# Patient Record
Sex: MALE | Race: WHITE | NOT HISPANIC OR LATINO | Employment: FULL TIME | URBAN - METROPOLITAN AREA
[De-identification: names, ages, dates, MRNs, and addresses within clinical notes are randomized per-mention and may not be internally consistent; named-entity substitution may affect disease eponyms.]

---

## 2023-03-07 LAB
ANION GAP IN SER/PLAS: 12 MMOL/L (ref 10–20)
CALCIUM (MG/DL) IN SER/PLAS: 8.8 MG/DL (ref 8.6–10.3)
CARBON DIOXIDE, TOTAL (MMOL/L) IN SER/PLAS: 25 MMOL/L (ref 21–32)
CHLORIDE (MMOL/L) IN SER/PLAS: 101 MMOL/L (ref 98–107)
CREATININE (MG/DL) IN SER/PLAS: 1.42 MG/DL (ref 0.5–1.3)
ERYTHROCYTE DISTRIBUTION WIDTH (RATIO) BY AUTOMATED COUNT: 12.8 % (ref 11.5–14.5)
ERYTHROCYTE MEAN CORPUSCULAR HEMOGLOBIN CONCENTRATION (G/DL) BY AUTOMATED: 31.6 G/DL (ref 32–36)
ERYTHROCYTE MEAN CORPUSCULAR VOLUME (FL) BY AUTOMATED COUNT: 97 FL (ref 80–100)
ERYTHROCYTES (10*6/UL) IN BLOOD BY AUTOMATED COUNT: 4.71 X10E12/L (ref 4.5–5.9)
GFR MALE: 54 ML/MIN/1.73M2
GLUCOSE (MG/DL) IN SER/PLAS: 106 MG/DL (ref 74–99)
HEMATOCRIT (%) IN BLOOD BY AUTOMATED COUNT: 45.5 % (ref 41–52)
HEMOGLOBIN (G/DL) IN BLOOD: 14.4 G/DL (ref 13.5–17.5)
INR IN PPP BY COAGULATION ASSAY: 3.6 (ref 0.9–1.1)
LEUKOCYTES (10*3/UL) IN BLOOD BY AUTOMATED COUNT: 10.9 X10E9/L (ref 4.4–11.3)
NRBC (PER 100 WBCS) BY AUTOMATED COUNT: 0 /100 WBC (ref 0–0)
PLATELETS (10*3/UL) IN BLOOD AUTOMATED COUNT: 160 X10E9/L (ref 150–450)
POTASSIUM (MMOL/L) IN SER/PLAS: 4.2 MMOL/L (ref 3.5–5.3)
PROTHROMBIN TIME (PT) IN PPP BY COAGULATION ASSAY: 42.5 SEC (ref 9.8–13.4)
SODIUM (MMOL/L) IN SER/PLAS: 134 MMOL/L (ref 136–145)
UREA NITROGEN (MG/DL) IN SER/PLAS: 26 MG/DL (ref 6–23)

## 2023-10-16 ENCOUNTER — HOSPITAL ENCOUNTER (OUTPATIENT)
Dept: CARDIOLOGY | Facility: CLINIC | Age: 68
Discharge: HOME | End: 2023-10-16
Payer: COMMERCIAL

## 2023-10-16 DIAGNOSIS — I44.30 UNSPECIFIED ATRIOVENTRICULAR BLOCK: ICD-10-CM

## 2023-10-16 DIAGNOSIS — I48.0 PAROXYSMAL ATRIAL FIBRILLATION (MULTI): Primary | ICD-10-CM

## 2023-10-16 DIAGNOSIS — Z95.0 PRESENCE OF CARDIAC PACEMAKER: ICD-10-CM

## 2023-10-16 PROBLEM — M48.07 LUMBOSACRAL SPINAL STENOSIS: Status: ACTIVE | Noted: 2023-10-16

## 2023-10-16 PROBLEM — E66.9 OBESITY: Status: ACTIVE | Noted: 2023-10-16

## 2023-10-16 PROBLEM — Q77.4 ACHONDROPLASIA (HHS-HCC): Status: ACTIVE | Noted: 2023-10-16

## 2023-10-16 PROBLEM — I10 HYPERTENSION: Status: ACTIVE | Noted: 2023-10-16

## 2023-10-16 PROBLEM — Z98.890 S/P LEFT ATRIAL APPENDAGE LIGATION: Status: ACTIVE | Noted: 2023-10-16

## 2023-10-16 PROBLEM — I34.2 NONRHEUMATIC MITRAL VALVE STENOSIS: Status: ACTIVE | Noted: 2023-10-16

## 2023-10-16 PROBLEM — Z95.2 S/P MVR (MITRAL VALVE REPLACEMENT): Status: ACTIVE | Noted: 2023-10-16

## 2023-10-16 PROBLEM — I34.0 MITRAL REGURGITATION: Status: ACTIVE | Noted: 2023-10-16

## 2023-10-16 PROBLEM — M79.10 MYALGIA: Status: ACTIVE | Noted: 2023-10-16

## 2023-10-16 PROBLEM — G47.33 OSA ON CPAP: Status: ACTIVE | Noted: 2023-10-16

## 2023-10-16 PROBLEM — E78.5 HYPERLIPIDEMIA: Status: ACTIVE | Noted: 2023-10-16

## 2023-10-16 PROBLEM — I50.30 HEART FAILURE WITH PRESERVED LEFT VENTRICULAR FUNCTION (HFPEF) (MULTI): Status: ACTIVE | Noted: 2023-10-16

## 2023-10-16 PROBLEM — M54.12 CERVICAL RADICULOPATHY: Status: ACTIVE | Noted: 2023-10-16

## 2023-10-16 PROBLEM — N52.9 ED (ERECTILE DYSFUNCTION): Status: ACTIVE | Noted: 2023-10-16

## 2023-10-16 PROBLEM — I25.10 MILD CAD: Status: ACTIVE | Noted: 2023-10-16

## 2023-10-16 PROCEDURE — 93296 REM INTERROG EVL PM/IDS: CPT

## 2023-10-16 PROCEDURE — 93294 REM INTERROG EVL PM/LDLS PM: CPT | Performed by: INTERNAL MEDICINE

## 2023-10-16 RX ORDER — FLUTICASONE PROPIONATE 50 MCG
2 SPRAY, SUSPENSION (ML) NASAL DAILY
COMMUNITY
Start: 2023-09-28 | End: 2024-01-25 | Stop reason: ALTCHOICE

## 2023-10-16 RX ORDER — POTASSIUM CHLORIDE 1500 MG/1
20 TABLET, EXTENDED RELEASE ORAL NIGHTLY
COMMUNITY
Start: 2023-07-17

## 2023-10-16 RX ORDER — METOPROLOL TARTRATE 25 MG/1
25 TABLET, FILM COATED ORAL 2 TIMES DAILY
COMMUNITY
Start: 2023-09-22 | End: 2024-05-25 | Stop reason: HOSPADM

## 2023-10-16 RX ORDER — LOSARTAN POTASSIUM 50 MG/1
50 TABLET ORAL DAILY
COMMUNITY
Start: 2023-09-22 | End: 2024-05-06

## 2023-10-16 RX ORDER — RIVAROXABAN 20 MG/1
20 TABLET, FILM COATED ORAL DAILY
Qty: 90 TABLET | Refills: 3 | Status: SHIPPED | OUTPATIENT
Start: 2023-10-16 | End: 2024-01-08 | Stop reason: SDUPTHER

## 2023-10-16 RX ORDER — AMIODARONE HYDROCHLORIDE 200 MG/1
200 TABLET ORAL DAILY
COMMUNITY
Start: 2023-09-15 | End: 2024-01-25 | Stop reason: ALTCHOICE

## 2023-10-16 RX ORDER — PANTOPRAZOLE SODIUM 40 MG/1
40 TABLET, DELAYED RELEASE ORAL
COMMUNITY
Start: 2023-03-09 | End: 2024-01-25 | Stop reason: ALTCHOICE

## 2023-10-16 RX ORDER — MULTIVIT-MIN/IRON FUM/FOLIC AC 7.5 MG-4
1 TABLET ORAL EVERY OTHER DAY
COMMUNITY

## 2023-10-16 RX ORDER — PRAVASTATIN SODIUM 10 MG/1
10 TABLET ORAL NIGHTLY
COMMUNITY
Start: 2023-10-15 | End: 2024-05-06

## 2023-10-16 RX ORDER — RIVAROXABAN 20 MG/1
20 TABLET, FILM COATED ORAL DAILY
COMMUNITY
Start: 2023-08-26 | End: 2023-10-16 | Stop reason: SDUPTHER

## 2023-10-16 RX ORDER — SILDENAFIL CITRATE 20 MG/1
20 TABLET ORAL AS NEEDED
COMMUNITY
Start: 2023-05-24

## 2023-10-16 RX ORDER — FUROSEMIDE 40 MG/1
40 TABLET ORAL DAILY
COMMUNITY
Start: 2023-10-07 | End: 2024-05-29 | Stop reason: ALTCHOICE

## 2023-11-28 ENCOUNTER — HOSPITAL ENCOUNTER (OUTPATIENT)
Dept: CARDIOLOGY | Facility: CLINIC | Age: 68
Discharge: HOME | End: 2023-11-28
Payer: COMMERCIAL

## 2023-11-28 DIAGNOSIS — I44.30 INCOMPLETE HEART BLOCK: ICD-10-CM

## 2023-11-28 DIAGNOSIS — Z95.0 PACEMAKER: ICD-10-CM

## 2023-12-28 ENCOUNTER — TELEPHONE (OUTPATIENT)
Dept: CARDIOLOGY | Facility: CLINIC | Age: 68
End: 2023-12-28
Payer: MEDICARE

## 2023-12-29 DIAGNOSIS — I48.0 PAROXYSMAL ATRIAL FIBRILLATION (MULTI): Primary | ICD-10-CM

## 2023-12-29 RX ORDER — DABIGATRAN ETEXILATE 75 MG/1
75 CAPSULE ORAL 2 TIMES DAILY
Qty: 180 CAPSULE | Refills: 3 | Status: SHIPPED | OUTPATIENT
Start: 2023-12-29 | End: 2024-05-25 | Stop reason: HOSPADM

## 2023-12-29 NOTE — TELEPHONE ENCOUNTER
Called and left detailed VM to pt, notified Dr. Mann ordered Pradaxa to [facility] pharmacy as possible cheaper alternative to Xarelto, and sent referral to  pharmacy for possible cost assistance if needed. Requested for pt to call back if he has any additional questions or concerns.

## 2024-01-02 NOTE — TELEPHONE ENCOUNTER
LM for pt that RX for generic pradaxa was sent to [facility] per Dr. AMINTA Mann. Also instructed pt to speak with our pharmacy staff when they called him to see about pt assistance for him.

## 2024-01-04 DIAGNOSIS — I48.0 PAROXYSMAL ATRIAL FIBRILLATION (MULTI): ICD-10-CM

## 2024-01-04 RX ORDER — RIVAROXABAN 20 MG/1
20 TABLET, FILM COATED ORAL DAILY
Qty: 90 TABLET | Refills: 3 | Status: CANCELLED | OUTPATIENT
Start: 2024-01-04

## 2024-01-04 NOTE — TELEPHONE ENCOUNTER
Called [facility]. They need actual script with signature. Please refill and print. We will fax to [facility] pharmacy at [phone] with customer ID of [id number]

## 2024-01-05 NOTE — TELEPHONE ENCOUNTER
Pts son called in and stated that # [phone] is not the appropriate contact number for Ananth, son provided  #[phone] to contact patient regarding this. I called and left VM to [phone] and requested for Ananth to call us back to discuss further.

## 2024-01-08 ENCOUNTER — TELEPHONE (OUTPATIENT)
Dept: CARDIOLOGY | Facility: CLINIC | Age: 69
End: 2024-01-08
Payer: MEDICARE

## 2024-01-08 DIAGNOSIS — I48.0 PAROXYSMAL ATRIAL FIBRILLATION (MULTI): ICD-10-CM

## 2024-01-08 RX ORDER — RIVAROXABAN 20 MG/1
20 TABLET, FILM COATED ORAL DAILY
Qty: 90 TABLET | Refills: 3 | Status: SHIPPED | OUTPATIENT
Start: 2024-01-08 | End: 2024-01-08

## 2024-01-08 NOTE — TELEPHONE ENCOUNTER
Pt called to tell us that pradaxa will be 1/3 cheaper the price of xarelto. He will have initiate pradaxa after his current prescription is complete of xarelto. I updated med list.

## 2024-01-08 NOTE — TELEPHONE ENCOUNTER
Pt still unaware of whether pradaxa would be cheaper. He is calling [facility] to compare prices. He will call us back when he decides. When he does do so please update medication list

## 2024-01-08 NOTE — TELEPHONE ENCOUNTER
Faxed prescription to [phone] ( [facility]). Called and left a message on VM letting patient know. Left return phone number in case he had questions.

## 2024-01-18 ENCOUNTER — TELEPHONE (OUTPATIENT)
Dept: CARDIOLOGY | Facility: CLINIC | Age: 69
End: 2024-01-18
Payer: MEDICARE

## 2024-01-18 DIAGNOSIS — I48.0 PAROXYSMAL ATRIAL FIBRILLATION (MULTI): Primary | ICD-10-CM

## 2024-01-18 NOTE — PROGRESS NOTES
"Pharmacist Clinic: Anticoagulation Management  Ananth Gimenez was referred to the Clinical Pharmacy Team for his anticoagulation management.    Referring Provider:  Da Mann  _______________________________________________________________________  PHARMACY ASSESSMENT    Allergies Reviewed? Yes, intolerant to atorvastatin (myalgia)  Home Pharmacy Reviewed? Yes, [facility] [address]    Affordability/Accessibility: ran out of Xarelto 20 mg the past month, out of pocket cost at local pharmacy is high. Patient reported ordering xarelto through [place] [facility] per cardiology office advice  Adherence/Organization: Patient reported adherence to all medications  Adverse Effects: patient noticed that he bruised easily when on Xarelto, manageable. Will continue to monitor      MEDICATION RECONCILIATION  Added:  - aspirin 81 mg - 2 tablets (162 mg) in the AM per Dr. Mann  - Xarelto 20 mg AM, patient ran out of medication, pending VAF  Changed:  - multivitamin every other day. Patient reported taking occasionally, would like to resume taking daily   Removed:  - amiodarone 200 mg. Stopped by Dr. Burks   - pantoprazole 40 mg, finished after surgery  - fluticasone    RELEVANT LAB RESULTS  Lab Results   Component Value Date    BILITOT 1.0 07/05/2022    CALCIUM 8.8 03/07/2023    CO2 25 03/07/2023     03/07/2023    CREATININE 1.42 (H) 03/07/2023    GLUCOSE 106 (H) 03/07/2023    ALKPHOS 103 07/05/2022    K 4.2 03/07/2023    PROT 6.8 07/05/2022     (L) 03/07/2023    AST 27 07/05/2022    ALT 34 07/05/2022    BUN 26 (H) 03/07/2023    ANIONGAP 12 03/07/2023    MG 2.05 07/05/2022    PHOS 3.7 12/09/2021    ALBUMIN 4.1 07/05/2022    GFRMALE 54 (A) 03/07/2023     Lab Results   Component Value Date    TRIG 62 08/17/2019    CHOL 129 08/17/2019    HDL 45.5 08/17/2019     No results found for: \"BMCBC\", \"CBCDIF\"       DRUG INTERACTIONS  - " No  _______________________________________________________________________  ANTICOAGULATION ASSESSMENT    The ASCVD Risk score (Hema JOSE, et al., 2019) failed to calculate for the following reasons:    Cannot find a previous HDL lab    Cannot find a previous total cholesterol lab    The smoking status is invalid    DIAGNOSIS: treatment of nonvalvular atrial fibrilliation stroke and systemic embolism  - Patient is projected to be on anticoagulation indefinitely  - [id number] Score: [3] (only included if diagnosis is atrial fibrillation)   Age: [<65 (0)] [65-74 (+1)] [> 75 (+2)]: 1  Sex: [Male/Female (+1)]: 0  CHF history: [No/Yes(+1)]: 1  Hypertension history: [No/Yes(+1)]: 1  Stroke/TIA/thromboembolism history: [No/Yes(+2)]: 0  Vascular disease history (prior MI, peripheral artery disease, aortic plaque): [No/Yes(+1)]: 0  Diabetes history: [No/Yes(+1)]: 0    CURRENT PHARMACOTHERAPY:   - Aspirin 81 mg - 2 tablets (162 mg) PO daily    PERTINANT MEDICAL HISTORY:  - Medical history: Mitral valve replacement, Afib, HFpEF, HTN  - Medication history: Xarelto 20 mg daily  _______________________________________________________________________  PATIENT EDUCATION/GOALS  - Counseled patient on MOA, expectations, duration of therapy, contraindications, administration, and monitoring parameters  - Counseled patient of side effects that are indicative of bleeding such as dark tarry stool, unexplainable bruising, or vomiting up a coffee ground like substance  - Answered all patient questions and concerns  _______________________________________________________________________  RECOMMENDATIONS/PLAN  1. Patient was hesitant to start Pradaxa given high bleeding risk, currently taking aspirin 162 mg PO daily. He has ordered Xarelto from [place] and is waiting for his shipment to arrive.  Continue aspirin 81 mg - 2 tablets PO daily     Patient Assistance Program (PAP)    Patient verbally reports monthly or yearly income  which is more than 400% federal poverty level. Patient reported recently retired in November 2023.    Application for program to be submitted for the following medications: Xarelto    Prescription Insurance: Yes  Members of Household: 2  Files Taxes: Yes  Life attestation event form was emailed to patient    Patient will be faxing financial information to pharmacist directly at [phone].      If approved medication must be filled through [facility] pharmacy and mailed to patient.       Next Cardiology Appointment: 3/5/2024  Clinical Pharmacist follow up: TBD Xarelto approval  Type of Encounter: Renetta Chiu PharmD    Verbal consent to manage patient's drug therapy was obtained from the patient . They were informed they may decline to participate or withdraw from participation in pharmacy services at any time.    Continue all meds under the continuation of care with the referring provider and clinical pharmacy team.

## 2024-01-18 NOTE — TELEPHONE ENCOUNTER
Patient called to ask what he should do about his Xarelto. Patient ordered Xarelto from [facility] pharmacy and it is not expected to arrive for a few weeks. Patient would like to know if there is anything he can take until his medication arrives.

## 2024-01-25 ENCOUNTER — TELEMEDICINE (OUTPATIENT)
Dept: PHARMACY | Facility: HOSPITAL | Age: 69
End: 2024-01-25
Payer: MEDICARE

## 2024-01-25 DIAGNOSIS — I48.0 PAROXYSMAL ATRIAL FIBRILLATION (MULTI): ICD-10-CM

## 2024-01-25 RX ORDER — ASPIRIN 81 MG/1
162 TABLET ORAL DAILY
COMMUNITY
End: 2024-03-07 | Stop reason: ALTCHOICE

## 2024-01-25 NOTE — Clinical Note
Flaco Mann! My resident spoke with Ananth regarding his anticoagulation therapy. He reported he has not started Pradaxa, only taking aspirin. He ordered Xarelto from a [place] source? We do not recommend patients going down this route. We are going to try to get him set up with [facility] P[facility] to help get Xarelto for free. I am not sure if your office has samples in the meantime? Thank you.

## 2024-01-25 NOTE — Clinical Note
Patient was hesitant to start Pradaxa given high bleeding risk, currently taking aspirin 162 mg PO daily. He has ordered Xarelto from [facility] and is waiting for his shipment to arrive. Could cardiologist reach out and provide patient with samples?

## 2024-02-19 ENCOUNTER — HOSPITAL ENCOUNTER (OUTPATIENT)
Dept: CARDIOLOGY | Facility: CLINIC | Age: 69
Discharge: HOME | End: 2024-02-19
Payer: MEDICARE

## 2024-02-19 DIAGNOSIS — I44.30 UNSPECIFIED ATRIOVENTRICULAR BLOCK: ICD-10-CM

## 2024-02-19 DIAGNOSIS — Z95.0 PRESENCE OF CARDIAC PACEMAKER: ICD-10-CM

## 2024-02-19 PROCEDURE — 93294 REM INTERROG EVL PM/LDLS PM: CPT | Performed by: INTERNAL MEDICINE

## 2024-02-19 PROCEDURE — 93296 REM INTERROG EVL PM/IDS: CPT

## 2024-03-05 ENCOUNTER — OFFICE VISIT (OUTPATIENT)
Dept: CARDIOLOGY | Facility: HOSPITAL | Age: 69
End: 2024-03-05
Payer: MEDICARE

## 2024-03-05 VITALS
BODY MASS INDEX: 28.66 KG/M2 | OXYGEN SATURATION: 96 % | HEART RATE: 86 BPM | SYSTOLIC BLOOD PRESSURE: 132 MMHG | WEIGHT: 146 LBS | HEIGHT: 60 IN | DIASTOLIC BLOOD PRESSURE: 57 MMHG

## 2024-03-05 DIAGNOSIS — I48.0 PAROXYSMAL ATRIAL FIBRILLATION (MULTI): Primary | ICD-10-CM

## 2024-03-05 DIAGNOSIS — R06.02 SHORTNESS OF BREATH: ICD-10-CM

## 2024-03-05 LAB
ATRIAL RATE: 86 BPM
P AXIS: 73 DEGREES
P OFFSET: 165 MS
P ONSET: 120 MS
PR INTERVAL: 136 MS
Q ONSET: 188 MS
QRS COUNT: 14 BEATS
QRS DURATION: 160 MS
QT INTERVAL: 470 MS
QTC CALCULATION(BAZETT): 562 MS
QTC FREDERICIA: 530 MS
R AXIS: 246 DEGREES
T AXIS: 62 DEGREES
T OFFSET: 423 MS
VENTRICULAR RATE: 86 BPM

## 2024-03-05 PROCEDURE — 99214 OFFICE O/P EST MOD 30 MIN: CPT | Performed by: INTERNAL MEDICINE

## 2024-03-05 PROCEDURE — 3078F DIAST BP <80 MM HG: CPT | Performed by: INTERNAL MEDICINE

## 2024-03-05 PROCEDURE — 1036F TOBACCO NON-USER: CPT | Performed by: INTERNAL MEDICINE

## 2024-03-05 PROCEDURE — 99214 OFFICE O/P EST MOD 30 MIN: CPT | Mod: 25 | Performed by: INTERNAL MEDICINE

## 2024-03-05 PROCEDURE — 1159F MED LIST DOCD IN RCRD: CPT | Performed by: INTERNAL MEDICINE

## 2024-03-05 PROCEDURE — 93005 ELECTROCARDIOGRAM TRACING: CPT | Performed by: INTERNAL MEDICINE

## 2024-03-05 PROCEDURE — 3075F SYST BP GE 130 - 139MM HG: CPT | Performed by: INTERNAL MEDICINE

## 2024-03-05 PROCEDURE — 1126F AMNT PAIN NOTED NONE PRSNT: CPT | Performed by: INTERNAL MEDICINE

## 2024-03-05 ASSESSMENT — ENCOUNTER SYMPTOMS
DEPRESSION: 0
LOSS OF SENSATION IN FEET: 0
OCCASIONAL FEELINGS OF UNSTEADINESS: 0

## 2024-03-05 ASSESSMENT — PAIN SCALES - GENERAL: PAINLEVEL: 0-NO PAIN

## 2024-03-05 NOTE — PROGRESS NOTES
Referred by Fredis Loredo MD provider found for   Chief Complaint   Patient presents with    Atrial Fibrillation        Ananth Gimenez is a 68 y.o. year old male patient with h/o PAF s/p A fib RFA a year ago. Presents for follow up.      PMHx/PSHx: As above    FamHx: unremarkable     Allergies:  Allergies   Allergen Reactions    Atorvastatin Myalgia     Pain in extremities. Tolerate low dose pravastatin        Review of Systems    Constitutional: not feeling tired.   Eyes: no eyesight problems.   ENT: no hearing loss and no nosebleeds.   Cardiovascular: no intermittent leg claudication and as noted in HPI.   Respiratory: no chronic cough and no shortness of breath.   Gastrointestinal: no change in bowel habits and no blood in stools.   Genitourinary: no urinary frequency and no hematuria.   Skin: no skin rashes.   Neurological: no seizures and no frequent falls.   Psychiatric: no depression and not suicidal.   All other systems have been reviewed and are negative for complaint.     Outpatient Medications:  Current Outpatient Medications   Medication Instructions    aspirin 162 mg, oral, Daily    dabigatran etexilate (PRADAXA) 75 mg, oral, 2 times daily, Do not crush or chew.    furosemide (LASIX) 40 mg, oral, Daily, 40 mg in the AM,     Klor-Con M20 20 mEq ER tablet 20 mEq, oral, Nightly    losartan (COZAAR) 50 mg, oral, Daily    metoprolol tartrate (LOPRESSOR) 25 mg, oral, 2 times daily    multivitamin with minerals (multivit-min-iron fum-folic ac) tablet 1 tablet, oral, Every other day    pravastatin (PRAVACHOL) 10 mg, oral, Nightly    rivaroxaban (XARELTO) 20 mg, oral, Every morning, Take with food. Pending PAP/VAF approval    sildenafil (REVATIO) 20 mg, oral, As needed         Last Recorded Vitals:      2/21/2023     9:24 AM 3/9/2023     8:43 AM 4/24/2023    11:17 AM 4/24/2023    12:06 PM 6/6/2023    11:57 AM 9/6/2023     1:46 PM 3/5/2024     2:09 PM   Vitals   Systolic 125  138 138 115 139 132  "  Diastolic 62  79 78 59 71 57   Heart Rate 60  74 77 62 65 86   Temp    36.5 °C (97.7 °F)      Resp    16  16    Height (in) 1.27 m (4' 2\") 1.27 m (4' 2\") 1.27 m (4' 2\") 1.27 m (4' 2\") 1.27 m (4' 2\") 1.27 m (4' 2\") 1.27 m (4' 2\")   Weight (lb) 156.25 156.09 155.38 155 152 146 146   BMI 43.94 kg/m2 43.9 kg/m2 43.7 kg/m2 43.59 kg/m2 42.75 kg/m2 41.06 kg/m2 41.06 kg/m2   BSA (m2) 1.58 m2 1.58 m2 1.58 m2 1.57 m2 1.56 m2 1.53 m2 1.53 m2   Visit Report       Report    Visit Vitals  /57 (BP Location: Left arm, Patient Position: Sitting, BP Cuff Size: Small adult)   Pulse 86   Ht 1.27 m (4' 2\")   Wt 66.2 kg (146 lb)   SpO2 96%   BMI 41.06 kg/m²   Smoking Status Never   BSA 1.53 m²        Physical Exam:  Constitutional: alert and in no acute distress.   Eyes: no erythema, swelling or discharge from the eye .   Neck: neck is supple, symmetric, trachea midline, no masses  and no thyromegaly .   Pulmonary: no increased work of breathing or signs of respiratory distress  and lungs clear to auscultation.    Cardiovascular: carotid pulses 2+ bilaterally with no bruit , JVP was normal, no thrills , regular rhythm, normal S1 and S2, no murmurs , pedal pulses 2+ bilaterally  and no edema .   Abdomen: abdomen non-tender, no masses  and no hepatomegaly .   Skin: skin warm and dry, normal skin turgor .   Psychiatric judgment and insight is normal  and oriented to person, place and time .        Assessment/Plan   Problem List Items Addressed This Visit             ICD-10-CM    Paroxysmal atrial fibrillation (CMS/HCC) - Primary I48.0    Relevant Orders    ECG 12 lead (Clinic Performed)     Other Visit Diagnoses         Codes    Shortness of breath     R06.02    Relevant Orders    Transthoracic Echo (TTE) Complete    Stress Test            Ananth Gimenez is a 68 y.o. year old male patient with h/o PAF s/p A fib RFA a year ago. Presents for follow up.    The patient reports doing well and denies palpitations or chest pain. His " only complain is getting SOB while walking a block, which recovers after resting. His current ECG shows A sense V paced rhythm, HR 86 bpm. Device interrogations showed normal device function, with episodes of A Tach reported by the device as A fib. Will continue his current medications. Will request a new echocardiogram and a treadmill stress test and will follow up with the results.         Fredis Burks MD  Cardiac Electrophysiology      Thank you very much for allowing me to participate in the care of this pleasant patient. Please do not hesitate to contact me with any further questions or concerns regarding his care.    **Disclaimer: This note was dictated by speech recognition, and every effort has been made to prevent any error in transcription, however minor errors may be present**

## 2024-03-05 NOTE — PROGRESS NOTES
"Pharmacist Clinic: Anticoagulation Management  Ananth Gimenez was referred to the Clinical Pharmacy Team for his anticoagulation management.    Referring Provider:  Da Mann     Last Appointment w/ Pharmacist: 1/5/2024  Pharmacist Name: Yamila Chiu  _______________________________________________________________________  PHARMACY ASSESSMENT    Review of Past Appointment:   - Patient was hesitant to start Pradaxa due to bleeding risk, was taking aspirin 162 mg daily, while waiting for Xarelto shipment from [facility]. He got a few month supply and is taking Xarelto as prescribed. Patient is interested in [facility] going forward for his Xarelto prescription.    RELEVANT LAB RESULTS  Lab Results   Component Value Date    BILITOT 1.0 07/05/2022    CALCIUM 8.8 03/07/2023    CO2 25 03/07/2023     03/07/2023    CREATININE 1.42 (H) 03/07/2023    GLUCOSE 106 (H) 03/07/2023    ALKPHOS 103 07/05/2022    K 4.2 03/07/2023    PROT 6.8 07/05/2022     (L) 03/07/2023    AST 27 07/05/2022    ALT 34 07/05/2022    BUN 26 (H) 03/07/2023    ANIONGAP 12 03/07/2023    MG 2.05 07/05/2022    PHOS 3.7 12/09/2021    ALBUMIN 4.1 07/05/2022    GFRMALE 54 (A) 03/07/2023     Lab Results   Component Value Date    TRIG 62 08/17/2019    CHOL 129 08/17/2019    HDL 45.5 08/17/2019     No results found for: \"BMCBC\", \"CBCDIF\"   _______________________________________________________________________  ANTICOAGULATION ASSESSMENT    The ASCVD Risk score (Hema JOSE, et al., 2019) failed to calculate for the following reasons:    Cannot find a previous HDL lab    Cannot find a previous total cholesterol lab    DIAGNOSIS: treatment of nonvalvular atrial fibrilliation stroke and systemic embolism  - Patient is projected to be on anticoagulation indefinitely  - [id number] Score: [3] (only included if diagnosis is atrial fibrillation)   Age: [<65 (0)] [65-74 (+1)] [> 75 (+2)]: 1  Sex: [Male/Female (+1)]: 0  CHF history: [No/Yes(+1)]: 1  Hypertension " history: [No/Yes(+1)]: 1  Stroke/TIA/thromboembolism history: [No/Yes(+2)]: 0  Vascular disease history (prior MI, peripheral artery disease, aortic plaque): [No/Yes(+1)]: 0  Diabetes history: [No/Yes(+1)]: 0    CURRENT PHARMACOTHERAPY:   - Xarelto 20 mg daily   - CrCl 30 ml/min (based on Scr 1.42 mg/dl 3/7/23, adjusted body weight), candidate for dose reduction pending updated lab    UPDATE ON PHARMACOTHERAPY:   Affordability/Accessibility: patient is unable to afford out of pocket cost for Xarelto, received a few month supply of Xarelto from [facility]  Adherence/Organization: taking Xarelto as prescribed  Adverse Effects: none reported  Recent Hospitalizations: none  Recent Falls/Trauma: none  Changes in Tobacco or Alcohol Intake: was not discussed    DISCUSSION/NOTES:  - Patient reported doing okay on Xarelto, no side effect reported at this visit. Patient was unaware of change in pharmacy insurance benefit; provided patient updated information and insurance phone number.     _______________________________________________________________________  PATIENT EDUCATION/GOALS  - Counseled patient on MOA, expectations, duration of therapy, contraindications, administration, and monitoring parameters  - Counseled patient of side effects that are indicative of bleeding such as dark tarry stool, unexplainable bruising, or vomiting up a coffee ground like substance  - Answered all patient questions and concerns  _______________________________________________________________________  RECOMMENDATIONS/PLAN  1. Continue Xarelto 20 mg daily  2. Needs update in labs, previous lab was 3/2023     Patient Assistance Program (PAP)    Patient verbally reports monthly or yearly income which is more than 400% federal poverty level in 2022, recently retired in November 2023. Patient reported currently receiving pension as income, will be filing taxes for 2023, his income post MCC is less than 400% federal poverty  level    Application for program to be submitted for the following medications: Xarelto    Prescription Insurance: Yes  Members of Household: 2  Files Taxes: Yes    Patient will be email financial information to pharmacist directly at [email or web address].    Patient aware this process may take up to 6 weeks.     If approved medication must be filled through [facility] pharmacy and mailed to patient.         Next Cardiology Appointment: 4/3/2024  Clinical Pharmacist follow up: 6/6/2024  VAF/Application Expiration: No  Type of Encounter: Virtual    Joshua Chiu    Verbal consent to manage patient's drug therapy was obtained from the patient . They were informed they may decline to participate or withdraw from participation in pharmacy services at any time.    Continue all meds under the continuation of care with the referring provider and clinical pharmacy team.

## 2024-03-07 ENCOUNTER — TELEMEDICINE (OUTPATIENT)
Dept: PHARMACY | Facility: HOSPITAL | Age: 69
End: 2024-03-07
Payer: MEDICARE

## 2024-03-07 DIAGNOSIS — I48.0 PAROXYSMAL ATRIAL FIBRILLATION (MULTI): Primary | ICD-10-CM

## 2024-03-07 NOTE — Clinical Note
Flaco Mann! Patient reported doing okay on Xarelto, no side effect reported at this visit. Patient was unaware of change in pharmacy insurance benefit; provided patient updated information and insurance phone number.  - Also, looks like he may need updated labs. Last lab panel was 3/2023. I see he sees you in April so wanted to give you a heads up.

## 2024-03-20 ENCOUNTER — HOSPITAL ENCOUNTER (OUTPATIENT)
Dept: CARDIOLOGY | Facility: HOSPITAL | Age: 69
Discharge: HOME | End: 2024-03-20
Payer: MEDICARE

## 2024-03-20 VITALS
HEIGHT: 60 IN | SYSTOLIC BLOOD PRESSURE: 132 MMHG | WEIGHT: 146 LBS | DIASTOLIC BLOOD PRESSURE: 57 MMHG | BODY MASS INDEX: 28.66 KG/M2

## 2024-03-20 DIAGNOSIS — R06.02 SHORTNESS OF BREATH: ICD-10-CM

## 2024-03-20 LAB
AORTIC VALVE MEAN GRADIENT: 6.1 MMHG
AORTIC VALVE PEAK VELOCITY: 1.69 M/S
AV PEAK GRADIENT: 11.4 MMHG
AVA (PEAK VEL): 1.8 CM2
AVA (VTI): 1.71 CM2
EJECTION FRACTION APICAL 4 CHAMBER: 33.4
EJECTION FRACTION: 36 %
LEFT ATRIUM VOLUME AREA LENGTH INDEX BSA: 45.5 ML/M2
LEFT VENTRICLE INTERNAL DIMENSION DIASTOLE: 4.96 CM (ref 3.5–6)
LEFT VENTRICULAR OUTFLOW TRACT DIAMETER: 1.99 CM
MITRAL VALVE E/E' RATIO: 36.65
RIGHT VENTRICLE FREE WALL PEAK S': 8 CM/S
RIGHT VENTRICLE PEAK SYSTOLIC PRESSURE: 33.5 MMHG
TRICUSPID ANNULAR PLANE SYSTOLIC EXCURSION: 1.5 CM

## 2024-03-20 PROCEDURE — 93017 CV STRESS TEST TRACING ONLY: CPT

## 2024-03-20 PROCEDURE — 2500000004 HC RX 250 GENERAL PHARMACY W/ HCPCS (ALT 636 FOR OP/ED): Performed by: INTERNAL MEDICINE

## 2024-03-20 PROCEDURE — 93016 CV STRESS TEST SUPVJ ONLY: CPT | Performed by: INTERNAL MEDICINE

## 2024-03-20 PROCEDURE — 93306 TTE W/DOPPLER COMPLETE: CPT | Performed by: INTERNAL MEDICINE

## 2024-03-20 PROCEDURE — 93018 CV STRESS TEST I&R ONLY: CPT | Performed by: INTERNAL MEDICINE

## 2024-03-20 PROCEDURE — 93306 TTE W/DOPPLER COMPLETE: CPT

## 2024-03-20 RX ADMIN — PERFLUTREN 2 ML OF DILUTION: 6.52 INJECTION, SUSPENSION INTRAVENOUS at 14:30

## 2024-04-03 ENCOUNTER — OFFICE VISIT (OUTPATIENT)
Dept: CARDIOLOGY | Facility: HOSPITAL | Age: 69
End: 2024-04-03
Payer: MEDICARE

## 2024-04-03 VITALS
HEIGHT: 60 IN | SYSTOLIC BLOOD PRESSURE: 121 MMHG | BODY MASS INDEX: 30.63 KG/M2 | WEIGHT: 156 LBS | DIASTOLIC BLOOD PRESSURE: 76 MMHG | HEART RATE: 76 BPM | OXYGEN SATURATION: 97 %

## 2024-04-03 DIAGNOSIS — R06.02 SHORTNESS OF BREATH: Primary | ICD-10-CM

## 2024-04-03 DIAGNOSIS — I48.0 PAROXYSMAL ATRIAL FIBRILLATION (MULTI): ICD-10-CM

## 2024-04-03 LAB
ATRIAL RATE: 77 BPM
Q ONSET: 213 MS
QRS COUNT: 12 BEATS
QRS DURATION: 134 MS
QT INTERVAL: 424 MS
QTC CALCULATION(BAZETT): 477 MS
QTC FREDERICIA: 458 MS
R AXIS: -62 DEGREES
T AXIS: 112 DEGREES
T OFFSET: 425 MS
VENTRICULAR RATE: 76 BPM

## 2024-04-03 PROCEDURE — 1036F TOBACCO NON-USER: CPT | Performed by: INTERNAL MEDICINE

## 2024-04-03 PROCEDURE — 99213 OFFICE O/P EST LOW 20 MIN: CPT | Performed by: INTERNAL MEDICINE

## 2024-04-03 PROCEDURE — 3074F SYST BP LT 130 MM HG: CPT | Performed by: INTERNAL MEDICINE

## 2024-04-03 PROCEDURE — 1159F MED LIST DOCD IN RCRD: CPT | Performed by: INTERNAL MEDICINE

## 2024-04-03 PROCEDURE — 93005 ELECTROCARDIOGRAM TRACING: CPT | Performed by: INTERNAL MEDICINE

## 2024-04-03 PROCEDURE — 3078F DIAST BP <80 MM HG: CPT | Performed by: INTERNAL MEDICINE

## 2024-04-03 RX ORDER — REGADENOSON 0.08 MG/ML
0.4 INJECTION, SOLUTION INTRAVENOUS
Status: CANCELLED | OUTPATIENT
Start: 2024-04-03

## 2024-04-03 ASSESSMENT — ENCOUNTER SYMPTOMS
LOSS OF SENSATION IN FEET: 0
OCCASIONAL FEELINGS OF UNSTEADINESS: 0
DEPRESSION: 0

## 2024-04-03 NOTE — PROGRESS NOTES
Referred by Fredis Loredo MD provider found for   Chief Complaint   Patient presents with    Atrial Fibrillation     Here for stress test and echo results        Ananth Gimenez is a 68 y.o. year old male patient with h/o AVR, PPM after surgery in 20221, A Fib s/p RFA a year ago. Was recently seen and found to have increase SOB with activities. Requested an echocardiogram and a stress test. Presents today to go over results.      PMHx/PSHx: As above    FamHx: unremarkable     Allergies:  Allergies   Allergen Reactions    Atorvastatin Myalgia     Pain in extremities. Tolerate low dose pravastatin        Review of Systems    Constitutional: not feeling tired.   Eyes: no eyesight problems.   ENT: no hearing loss and no nosebleeds.   Cardiovascular: no intermittent leg claudication and as noted in HPI.   Respiratory: no chronic cough and no shortness of breath.   Gastrointestinal: no change in bowel habits and no blood in stools.   Genitourinary: no urinary frequency and no hematuria.   Skin: no skin rashes.   Neurological: no seizures and no frequent falls.   Psychiatric: no depression and not suicidal.   All other systems have been reviewed and are negative for complaint.     Outpatient Medications:  Current Outpatient Medications   Medication Instructions    dabigatran etexilate (PRADAXA) 75 mg, oral, 2 times daily, Do not crush or chew.    furosemide (LASIX) 40 mg, oral, Daily, 40 mg in the AM,     Klor-Con M20 20 mEq ER tablet 20 mEq, oral, Nightly    losartan (COZAAR) 50 mg, oral, Daily    metoprolol tartrate (LOPRESSOR) 25 mg, oral, 2 times daily    multivitamin with minerals (multivit-min-iron fum-folic ac) tablet 1 tablet, oral, Every other day    pravastatin (PRAVACHOL) 10 mg, oral, Nightly    rivaroxaban (XARELTO) 20 mg, oral, Every morning, Take with food.    sildenafil (REVATIO) 20 mg, oral, As needed         Last Recorded Vitals:      4/24/2023    11:17 AM 4/24/2023    12:06 PM 6/6/2023    11:57  "AM 9/6/2023     1:46 PM 3/5/2024     2:09 PM 3/20/2024     1:36 PM 4/3/2024    12:52 PM   Vitals   Systolic 138 138 115 139 132 132 121   Diastolic 79 78 59 71 57 57 76   Heart Rate 74 77 62 65 86  76   Temp  36.5 °C (97.7 °F)        Resp  16  16      Height (in) 1.27 m (4' 2\") 1.27 m (4' 2\") 1.27 m (4' 2\") 1.27 m (4' 2\") 1.27 m (4' 2\") 1.27 m (4' 2\") 1.27 m (4' 2\")   Weight (lb) 155.38 155 152 146 146 146 156   BMI 43.7 kg/m2 43.59 kg/m2 42.75 kg/m2 41.06 kg/m2 41.06 kg/m2 41.06 kg/m2 43.87 kg/m2   BSA (m2) 1.58 m2 1.57 m2 1.56 m2 1.53 m2 1.53 m2 1.53 m2 1.58 m2   Visit Report     Report  Report    Visit Vitals  /76 (BP Location: Left arm, Patient Position: Sitting, BP Cuff Size: Adult)   Pulse 76   Ht 1.27 m (4' 2\")   Wt 70.8 kg (156 lb)   SpO2 97%   BMI 43.87 kg/m²   Smoking Status Never   BSA 1.58 m²        Physical Exam:  Constitutional: alert and in no acute distress.   Eyes: no erythema, swelling or discharge from the eye .   Neck: neck is supple, symmetric, trachea midline, no masses  and no thyromegaly .   Pulmonary: no increased work of breathing or signs of respiratory distress  and lungs clear to auscultation.    Cardiovascular: carotid pulses 2+ bilaterally with no bruit , JVP was normal, no thrills , regular rhythm, normal S1 and S2, no murmurs , pedal pulses 2+ bilaterally  and no edema .   Abdomen: abdomen non-tender, no masses  and no hepatomegaly .   Skin: skin warm and dry, normal skin turgor .   Psychiatric judgment and insight is normal  and oriented to person, place and time .        Assessment/Plan   Problem List Items Addressed This Visit             ICD-10-CM    Paroxysmal atrial fibrillation (CMS/HCC) - Primary I48.0    Relevant Orders    ECG 12 lead (Clinic Performed)       Ananth Elykiewicz is a 68 y.o. year old male patient with h/o AVR, PPM after surgery in 20221, A Fib s/p RFA a year ago. Was recently seen and found to have increase SOB with activities. Requested an " echocardiogram and a stress test. Presents today to go over results.    His current ECG shows A sense V paced rhythm with PACs. His echocardiogram showed a drop of his LVEF to 20-25%, with normal valve function. The stress test was none diagnostic due to poor ability to exercise. At this point his drop in LVEF could be 2nd ot ischemia or pacemaker mediated (pace 87% in the RV). Will request a pharmacologic stress test. If positive then will refer for LHC, otherwise if negative will need to proceed with RV lead extraction and upgrade to CRT-D.         Fredis Burks MD  Cardiac Electrophysiology      Thank you very much for allowing me to participate in the care of this pleasant patient. Please do not hesitate to contact me with any further questions or concerns regarding his care.    **Disclaimer: This note was dictated by speech recognition, and every effort has been made to prevent any error in transcription, however minor errors may be present**

## 2024-04-08 ENCOUNTER — SPECIALTY PHARMACY (OUTPATIENT)
Dept: PHARMACY | Facility: CLINIC | Age: 69
End: 2024-04-08

## 2024-04-09 ENCOUNTER — TELEPHONE (OUTPATIENT)
Dept: PHARMACY | Facility: HOSPITAL | Age: 69
End: 2024-04-09
Payer: MEDICARE

## 2024-04-09 PROCEDURE — RXMED WILLOW AMBULATORY MEDICATION CHARGE

## 2024-04-09 NOTE — TELEPHONE ENCOUNTER
Patient Assistance Program Approval:     We are pleased to inform you that your application for assistance has been approved.     This approval is valid through  4/9/2025  as long as the following criteria continue to be satisfied:     Your medication (Xarelto) remains covered under your current insurance plan.   Your prescriber does not discontinue therapy.   You do not seek reimbursement from any other private or government-funded programs for the  medication.    Under this program, the pharmacy will first bill your insurance plan for your indemnified specified medication. The [employer] Assistance Fund will then offset your copay balance, so that your out-of pocket expense for your specialty medication will be $0.00.    Destiny Shaffer, PharmD

## 2024-04-12 ENCOUNTER — PHARMACY VISIT (OUTPATIENT)
Dept: PHARMACY | Facility: CLINIC | Age: 69
End: 2024-04-12
Payer: COMMERCIAL

## 2024-04-19 ENCOUNTER — HOSPITAL ENCOUNTER (OUTPATIENT)
Dept: RADIOLOGY | Facility: HOSPITAL | Age: 69
Discharge: HOME | End: 2024-04-19
Payer: MEDICARE

## 2024-04-19 ENCOUNTER — HOSPITAL ENCOUNTER (OUTPATIENT)
Dept: CARDIOLOGY | Facility: HOSPITAL | Age: 69
Discharge: HOME | End: 2024-04-19
Payer: MEDICARE

## 2024-04-19 DIAGNOSIS — R06.02 SHORTNESS OF BREATH: ICD-10-CM

## 2024-04-19 PROCEDURE — 3430000001 HC RX 343 DIAGNOSTIC RADIOPHARMACEUTICALS: Performed by: INTERNAL MEDICINE

## 2024-04-19 PROCEDURE — 78452 HT MUSCLE IMAGE SPECT MULT: CPT

## 2024-04-19 PROCEDURE — A9502 TC99M TETROFOSMIN: HCPCS | Performed by: INTERNAL MEDICINE

## 2024-04-19 PROCEDURE — 78452 HT MUSCLE IMAGE SPECT MULT: CPT | Performed by: STUDENT IN AN ORGANIZED HEALTH CARE EDUCATION/TRAINING PROGRAM

## 2024-04-19 PROCEDURE — 2500000004 HC RX 250 GENERAL PHARMACY W/ HCPCS (ALT 636 FOR OP/ED): Performed by: INTERNAL MEDICINE

## 2024-04-19 PROCEDURE — 93017 CV STRESS TEST TRACING ONLY: CPT

## 2024-04-19 RX ORDER — REGADENOSON 0.08 MG/ML
0.4 INJECTION, SOLUTION INTRAVENOUS
Status: COMPLETED | OUTPATIENT
Start: 2024-04-19 | End: 2024-04-19

## 2024-04-19 RX ADMIN — TETROFOSMIN 11.4 MILLICURIE: 0.23 INJECTION, POWDER, LYOPHILIZED, FOR SOLUTION INTRAVENOUS at 09:33

## 2024-04-19 RX ADMIN — TETROFOSMIN 30 MILLICURIE: 0.23 INJECTION, POWDER, LYOPHILIZED, FOR SOLUTION INTRAVENOUS at 11:39

## 2024-04-19 RX ADMIN — REGADENOSON 0.4 MG: 0.08 INJECTION, SOLUTION INTRAVENOUS at 11:34

## 2024-04-30 ENCOUNTER — OFFICE VISIT (OUTPATIENT)
Dept: CARDIOLOGY | Facility: CLINIC | Age: 69
End: 2024-04-30
Payer: MEDICARE

## 2024-04-30 VITALS
BODY MASS INDEX: 29.86 KG/M2 | HEART RATE: 80 BPM | WEIGHT: 152.13 LBS | DIASTOLIC BLOOD PRESSURE: 70 MMHG | SYSTOLIC BLOOD PRESSURE: 122 MMHG | OXYGEN SATURATION: 97 % | HEIGHT: 60 IN

## 2024-04-30 DIAGNOSIS — I48.0 PAROXYSMAL ATRIAL FIBRILLATION (MULTI): Primary | ICD-10-CM

## 2024-04-30 PROCEDURE — 1036F TOBACCO NON-USER: CPT | Performed by: INTERNAL MEDICINE

## 2024-04-30 PROCEDURE — 93005 ELECTROCARDIOGRAM TRACING: CPT | Performed by: INTERNAL MEDICINE

## 2024-04-30 PROCEDURE — 1159F MED LIST DOCD IN RCRD: CPT | Performed by: INTERNAL MEDICINE

## 2024-04-30 PROCEDURE — 99213 OFFICE O/P EST LOW 20 MIN: CPT | Performed by: INTERNAL MEDICINE

## 2024-04-30 PROCEDURE — 3074F SYST BP LT 130 MM HG: CPT | Performed by: INTERNAL MEDICINE

## 2024-04-30 PROCEDURE — 1125F AMNT PAIN NOTED PAIN PRSNT: CPT | Performed by: INTERNAL MEDICINE

## 2024-04-30 PROCEDURE — 3078F DIAST BP <80 MM HG: CPT | Performed by: INTERNAL MEDICINE

## 2024-04-30 ASSESSMENT — PAIN SCALES - GENERAL: PAINLEVEL: 6

## 2024-04-30 NOTE — PROGRESS NOTES
Referred by Dr. Miles ref. provider found provider found for No chief complaint on file.       Ananth Gimenez is a 68 y.o. year old male patient with h/o AVR complicated with CHB s/p PPM, PAF s/p RFA. Was recently seen for concern of new cardiomyopathy with LVEF 25-30% by echocardiogram. Underwent a pharmacologic stress test which showed no evidence of ischemia. Presents to discuss study results.       PMHx/PSHx: As above    FamHx: unremarkable     Allergies:  Allergies   Allergen Reactions    Atorvastatin Myalgia     Pain in extremities. Tolerate low dose pravastatin        Review of Systems    Constitutional: not feeling tired.   Eyes: no eyesight problems.   ENT: no hearing loss and no nosebleeds.   Cardiovascular: no intermittent leg claudication and as noted in HPI.   Respiratory: no chronic cough and no shortness of breath.   Gastrointestinal: no change in bowel habits and no blood in stools.   Genitourinary: no urinary frequency and no hematuria.   Skin: no skin rashes.   Neurological: no seizures and no frequent falls.   Psychiatric: no depression and not suicidal.   All other systems have been reviewed and are negative for complaint.     Outpatient Medications:  Current Outpatient Medications   Medication Instructions    dabigatran etexilate (PRADAXA) 75 mg, oral, 2 times daily, Do not crush or chew.    fluticasone propionate (FLONASE ALLERGY RELIEF NASL) nasal, 2 sprays in each nostril twice a day    furosemide (LASIX) 40 mg, oral, Daily, 40 mg in the AM,     Klor-Con M20 20 mEq ER tablet 20 mEq, oral, Nightly    losartan (COZAAR) 50 mg, oral, Daily    metoprolol tartrate (LOPRESSOR) 25 mg, oral, 2 times daily    multivitamin with minerals (multivit-min-iron fum-folic ac) tablet 1 tablet, oral, Every other day    pravastatin (PRAVACHOL) 10 mg, oral, Nightly    sildenafil (REVATIO) 20 mg, oral, As needed    Xarelto 20 mg, oral, Every morning, Take with food.         Last Recorded Vitals:      4/24/2023     "12:06 PM 6/6/2023    11:57 AM 9/6/2023     1:46 PM 3/5/2024     2:09 PM 3/20/2024     1:36 PM 4/3/2024    12:52 PM 4/30/2024     3:05 PM   Vitals   Systolic 138 115 139 132 132 121    Diastolic 78 59 71 57 57 76    Heart Rate 77 62 65 86  76    Temp 36.5 °C (97.7 °F)         Resp 16  16       Height (in) 1.27 m (4' 2\") 1.27 m (4' 2\") 1.27 m (4' 2\") 1.27 m (4' 2\") 1.27 m (4' 2\") 1.27 m (4' 2\") 1.27 m (4' 2\")   Weight (lb) 155 152 146 146 146 156 152.13   BMI 43.59 kg/m2 42.75 kg/m2 41.06 kg/m2 41.06 kg/m2 41.06 kg/m2 43.87 kg/m2 42.78 kg/m2   BSA (m2) 1.57 m2 1.56 m2 1.53 m2 1.53 m2 1.53 m2 1.58 m2 1.56 m2   Visit Report    Report  Report Report    Visit Vitals  Ht 1.27 m (4' 2\")   Wt 69 kg (152 lb 2 oz)   BMI 42.78 kg/m²   Smoking Status Never   BSA 1.56 m²        Physical Exam:  Constitutional: alert and in no acute distress.   Eyes: no erythema, swelling or discharge from the eye .   Neck: neck is supple, symmetric, trachea midline, no masses  and no thyromegaly .   Pulmonary: no increased work of breathing or signs of respiratory distress  and lungs clear to auscultation.    Cardiovascular: carotid pulses 2+ bilaterally with no bruit , JVP was normal, no thrills , regular rhythm, normal S1 and S2, no murmurs , pedal pulses 2+ bilaterally  and no edema .   Abdomen: abdomen non-tender, no masses  and no hepatomegaly .   Skin: skin warm and dry, normal skin turgor .   Psychiatric judgment and insight is normal  and oriented to person, place and time .        Assessment/Plan   Problem List Items Addressed This Visit             ICD-10-CM    Paroxysmal atrial fibrillation (Multi) - Primary I48.0       Ananth Gimenez is a 68 y.o. year old male patient with h/o AVR complicated with CHB s/p PPM, PAF s/p RFA. Was recently seen for concern of new cardiomyopathy with LVEF 25-30% by echocardiogram. Underwent a pharmacologic stress test which showed no evidence of ischemia. Presents to discuss study results.     These " results are consistent with pacemaker induced cardiomyopathy and he will benefit from RV lead extraction and upgrade to CRT-D. All the R/B/A of the procedure were discussed with the patient and a share decision document was providede to the patient. He expressed understanding and agrees to proceed.         Fredis Burks MD  Cardiac Electrophysiology      Thank you very much for allowing me to participate in the care of this pleasant patient. Please do not hesitate to contact me with any further questions or concerns regarding his care.    **Disclaimer: This note was dictated by speech recognition, and every effort has been made to prevent any error in transcription, however minor errors may be present**

## 2024-05-08 LAB
ATRIAL RATE: 65 BPM
Q ONSET: 191 MS
QRS COUNT: 11 BEATS
QRS DURATION: 158 MS
QT INTERVAL: 486 MS
QTC CALCULATION(BAZETT): 509 MS
QTC FREDERICIA: 502 MS
R AXIS: 254 DEGREES
T AXIS: 81 DEGREES
T OFFSET: 434 MS
VENTRICULAR RATE: 66 BPM

## 2024-05-20 ENCOUNTER — HOSPITAL ENCOUNTER (OUTPATIENT)
Dept: CARDIOLOGY | Facility: CLINIC | Age: 69
Discharge: HOME | End: 2024-05-20
Payer: MEDICARE

## 2024-05-20 DIAGNOSIS — I44.30 UNSPECIFIED ATRIOVENTRICULAR BLOCK: ICD-10-CM

## 2024-05-20 DIAGNOSIS — Z95.0 PRESENCE OF CARDIAC PACEMAKER: ICD-10-CM

## 2024-05-20 PROCEDURE — 93294 REM INTERROG EVL PM/LDLS PM: CPT | Performed by: INTERNAL MEDICINE

## 2024-05-20 PROCEDURE — 93296 REM INTERROG EVL PM/IDS: CPT

## 2024-05-21 ENCOUNTER — LAB REQUISITION (OUTPATIENT)
Dept: LAB | Facility: HOSPITAL | Age: 69
End: 2024-05-21
Payer: MEDICARE

## 2024-05-21 ENCOUNTER — LAB (OUTPATIENT)
Dept: LAB | Facility: LAB | Age: 69
End: 2024-05-21
Payer: MEDICARE

## 2024-05-21 DIAGNOSIS — I50.30 UNSPECIFIED DIASTOLIC (CONGESTIVE) HEART FAILURE (MULTI): ICD-10-CM

## 2024-05-21 DIAGNOSIS — Z01.818 PRE-OP TESTING: ICD-10-CM

## 2024-05-21 DIAGNOSIS — I50.30 HEART FAILURE WITH PRESERVED LEFT VENTRICULAR FUNCTION (HFPEF) (MULTI): ICD-10-CM

## 2024-05-21 DIAGNOSIS — I48.0 PAROXYSMAL ATRIAL FIBRILLATION (MULTI): ICD-10-CM

## 2024-05-21 DIAGNOSIS — Z01.818 PRE-OP TESTING: Primary | ICD-10-CM

## 2024-05-21 LAB
ABO GROUP (TYPE) IN BLOOD: NORMAL
ANION GAP SERPL CALC-SCNC: 12 MMOL/L (ref 10–20)
ANTIBODY SCREEN: NORMAL
BUN SERPL-MCNC: 20 MG/DL (ref 6–23)
CALCIUM SERPL-MCNC: 9.3 MG/DL (ref 8.6–10.3)
CHLORIDE SERPL-SCNC: 103 MMOL/L (ref 98–107)
CO2 SERPL-SCNC: 26 MMOL/L (ref 21–32)
CREAT SERPL-MCNC: 1.16 MG/DL (ref 0.5–1.3)
EGFRCR SERPLBLD CKD-EPI 2021: 69 ML/MIN/1.73M*2
ERYTHROCYTE [DISTWIDTH] IN BLOOD BY AUTOMATED COUNT: 13.2 % (ref 11.5–14.5)
GLUCOSE SERPL-MCNC: 98 MG/DL (ref 74–99)
HCT VFR BLD AUTO: 44.4 % (ref 41–52)
HGB BLD-MCNC: 14.2 G/DL (ref 13.5–17.5)
MCH RBC QN AUTO: 30.7 PG (ref 26–34)
MCHC RBC AUTO-ENTMCNC: 32 G/DL (ref 32–36)
MCV RBC AUTO: 96 FL (ref 80–100)
NRBC BLD-RTO: 0 /100 WBCS (ref 0–0)
PLATELET # BLD AUTO: 170 X10*3/UL (ref 150–450)
POTASSIUM SERPL-SCNC: 4.6 MMOL/L (ref 3.5–5.3)
RBC # BLD AUTO: 4.62 X10*6/UL (ref 4.5–5.9)
RH FACTOR (ANTIGEN D): NORMAL
SODIUM SERPL-SCNC: 136 MMOL/L (ref 136–145)
WBC # BLD AUTO: 7.6 X10*3/UL (ref 4.4–11.3)

## 2024-05-21 PROCEDURE — 36415 COLL VENOUS BLD VENIPUNCTURE: CPT

## 2024-05-21 PROCEDURE — 86900 BLOOD TYPING SEROLOGIC ABO: CPT

## 2024-05-21 PROCEDURE — 86850 RBC ANTIBODY SCREEN: CPT

## 2024-05-21 PROCEDURE — 86901 BLOOD TYPING SEROLOGIC RH(D): CPT

## 2024-05-21 PROCEDURE — 80048 BASIC METABOLIC PNL TOTAL CA: CPT

## 2024-05-21 PROCEDURE — 85027 COMPLETE CBC AUTOMATED: CPT

## 2024-05-24 ENCOUNTER — ANESTHESIA (OUTPATIENT)
Dept: OPERATING ROOM | Facility: HOSPITAL | Age: 69
DRG: 277 | End: 2024-05-24
Payer: MEDICARE

## 2024-05-24 ENCOUNTER — APPOINTMENT (OUTPATIENT)
Dept: RADIOLOGY | Facility: HOSPITAL | Age: 69
DRG: 277 | End: 2024-05-24
Payer: MEDICARE

## 2024-05-24 ENCOUNTER — APPOINTMENT (OUTPATIENT)
Dept: CARDIOLOGY | Facility: HOSPITAL | Age: 69
DRG: 277 | End: 2024-05-24
Payer: MEDICARE

## 2024-05-24 ENCOUNTER — HOSPITAL ENCOUNTER (OUTPATIENT)
Dept: OPERATING ROOM | Facility: HOSPITAL | Age: 69
Discharge: HOME | End: 2024-05-24

## 2024-05-24 ENCOUNTER — HOSPITAL ENCOUNTER (INPATIENT)
Facility: HOSPITAL | Age: 69
LOS: 1 days | Discharge: HOME | DRG: 277 | End: 2024-05-25
Attending: INTERNAL MEDICINE | Admitting: INTERNAL MEDICINE
Payer: MEDICARE

## 2024-05-24 ENCOUNTER — ANESTHESIA EVENT (OUTPATIENT)
Dept: OPERATING ROOM | Facility: HOSPITAL | Age: 69
DRG: 277 | End: 2024-05-24
Payer: MEDICARE

## 2024-05-24 DIAGNOSIS — Z95.0 PACEMAKER: ICD-10-CM

## 2024-05-24 DIAGNOSIS — I50.30 UNSPECIFIED DIASTOLIC (CONGESTIVE) HEART FAILURE: ICD-10-CM

## 2024-05-24 DIAGNOSIS — I50.30 HEART FAILURE WITH PRESERVED LEFT VENTRICULAR FUNCTION (HFPEF): ICD-10-CM

## 2024-05-24 DIAGNOSIS — I48.0 PAROXYSMAL ATRIAL FIBRILLATION (MULTI): ICD-10-CM

## 2024-05-24 DIAGNOSIS — Z95.2 S/P MVR (MITRAL VALVE REPLACEMENT): ICD-10-CM

## 2024-05-24 DIAGNOSIS — I44.30 AVB (ATRIOVENTRICULAR BLOCK): Primary | ICD-10-CM

## 2024-05-24 DIAGNOSIS — Z45.02 ICD (IMPLANTABLE CARDIOVERTER-DEFIBRILLATOR) BATTERY DEPLETION: ICD-10-CM

## 2024-05-24 DIAGNOSIS — I1A.0 RESISTANT HYPERTENSION: ICD-10-CM

## 2024-05-24 DIAGNOSIS — T82.110A BREAKDOWN (MECHANICAL) OF CARDIAC ELECTRODE, INITIAL ENCOUNTER: ICD-10-CM

## 2024-05-24 DIAGNOSIS — G89.18 POST-OPERATIVE PAIN: ICD-10-CM

## 2024-05-24 LAB
ABO GROUP (TYPE) IN BLOOD: NORMAL
BLOOD EXPIRATION DATE: NORMAL
DISPENSE STATUS: NORMAL
PRODUCT BLOOD TYPE: 6200
PRODUCT CODE: NORMAL
RH FACTOR (ANTIGEN D): NORMAL
UNIT ABO: NORMAL
UNIT NUMBER: NORMAL
UNIT RH: NORMAL
UNIT VOLUME: 350
XM INTEP: NORMAL

## 2024-05-24 PROCEDURE — A4312 CATH W/O BAG 2-WAY SILICONE: HCPCS | Performed by: INTERNAL MEDICINE

## 2024-05-24 PROCEDURE — 2720000007 HC OR 272 NO HCPCS: Performed by: INTERNAL MEDICINE

## 2024-05-24 PROCEDURE — 2500000005 HC RX 250 GENERAL PHARMACY W/O HCPCS: Performed by: INTERNAL MEDICINE

## 2024-05-24 PROCEDURE — 86923 COMPATIBILITY TEST ELECTRIC: CPT | Mod: 91

## 2024-05-24 PROCEDURE — 02HK3KZ INSERTION OF DEFIBRILLATOR LEAD INTO RIGHT VENTRICLE, PERCUTANEOUS APPROACH: ICD-10-PCS | Performed by: INTERNAL MEDICINE

## 2024-05-24 PROCEDURE — 33225 L VENTRIC PACING LEAD ADD-ON: CPT | Performed by: INTERNAL MEDICINE

## 2024-05-24 PROCEDURE — 3600000012 HC PERFUSION TIME - EACH INCREMENTAL 1 MINUTE: Performed by: INTERNAL MEDICINE

## 2024-05-24 PROCEDURE — 2500000001 HC RX 250 WO HCPCS SELF ADMINISTERED DRUGS (ALT 637 FOR MEDICARE OP)

## 2024-05-24 PROCEDURE — A4649 SURGICAL SUPPLIES: HCPCS | Performed by: INTERNAL MEDICINE

## 2024-05-24 PROCEDURE — 2750000001 HC OR 275 NO HCPCS: Performed by: INTERNAL MEDICINE

## 2024-05-24 PROCEDURE — C1892 INTRO/SHEATH,FIXED,PEEL-AWAY: HCPCS | Performed by: INTERNAL MEDICINE

## 2024-05-24 PROCEDURE — C1773 RET DEV, INSERTABLE: HCPCS | Performed by: INTERNAL MEDICINE

## 2024-05-24 PROCEDURE — 2500000005 HC RX 250 GENERAL PHARMACY W/O HCPCS: Performed by: ANESTHESIOLOGIST ASSISTANT

## 2024-05-24 PROCEDURE — 2500000004 HC RX 250 GENERAL PHARMACY W/ HCPCS (ALT 636 FOR OP/ED): Performed by: INTERNAL MEDICINE

## 2024-05-24 PROCEDURE — C1900 LEAD, CORONARY VENOUS: HCPCS | Performed by: INTERNAL MEDICINE

## 2024-05-24 PROCEDURE — 33233 REMOVAL OF PM GENERATOR: CPT | Performed by: INTERNAL MEDICINE

## 2024-05-24 PROCEDURE — 2500000002 HC RX 250 W HCPCS SELF ADMINISTERED DRUGS (ALT 637 FOR MEDICARE OP, ALT 636 FOR OP/ED)

## 2024-05-24 PROCEDURE — 2550000001 HC RX 255 CONTRASTS: Performed by: INTERNAL MEDICINE

## 2024-05-24 PROCEDURE — 36415 COLL VENOUS BLD VENIPUNCTURE: CPT | Performed by: ANESTHESIOLOGIST ASSISTANT

## 2024-05-24 PROCEDURE — C1882 AICD, OTHER THAN SING/DUAL: HCPCS | Performed by: INTERNAL MEDICINE

## 2024-05-24 PROCEDURE — 3600000011 HC PERFUSION TIME - INITIAL BASE CHARGE: Performed by: INTERNAL MEDICINE

## 2024-05-24 PROCEDURE — C2629 INTRO/SHEATH, LASER: HCPCS | Performed by: INTERNAL MEDICINE

## 2024-05-24 PROCEDURE — 71045 X-RAY EXAM CHEST 1 VIEW: CPT | Performed by: RADIOLOGY

## 2024-05-24 PROCEDURE — C1730 CATH, EP, 19 OR FEW ELECT: HCPCS | Performed by: INTERNAL MEDICINE

## 2024-05-24 PROCEDURE — 3700000001 HC GENERAL ANESTHESIA TIME - INITIAL BASE CHARGE: Performed by: INTERNAL MEDICINE

## 2024-05-24 PROCEDURE — 2780000003 HC OR 278 NO HCPCS: Performed by: INTERNAL MEDICINE

## 2024-05-24 PROCEDURE — 3600000003 HC OR TIME - INITIAL BASE CHARGE - PROCEDURE LEVEL THREE: Performed by: INTERNAL MEDICINE

## 2024-05-24 PROCEDURE — C1777 LEAD, AICD, ENDO SINGLE COIL: HCPCS | Performed by: INTERNAL MEDICINE

## 2024-05-24 PROCEDURE — 0JH608Z INSERTION OF DEFIBRILLATOR GENERATOR INTO CHEST SUBCUTANEOUS TISSUE AND FASCIA, OPEN APPROACH: ICD-10-PCS | Performed by: INTERNAL MEDICINE

## 2024-05-24 PROCEDURE — 02H63KZ INSERTION OF DEFIBRILLATOR LEAD INTO RIGHT ATRIUM, PERCUTANEOUS APPROACH: ICD-10-PCS | Performed by: INTERNAL MEDICINE

## 2024-05-24 PROCEDURE — 0JPT0PZ REMOVAL OF CARDIAC RHYTHM RELATED DEVICE FROM TRUNK SUBCUTANEOUS TISSUE AND FASCIA, OPEN APPROACH: ICD-10-PCS | Performed by: INTERNAL MEDICINE

## 2024-05-24 PROCEDURE — 33249 INSJ/RPLCMT DEFIB W/LEAD(S): CPT | Performed by: INTERNAL MEDICINE

## 2024-05-24 PROCEDURE — C1769 GUIDE WIRE: HCPCS | Performed by: INTERNAL MEDICINE

## 2024-05-24 PROCEDURE — 99223 1ST HOSP IP/OBS HIGH 75: CPT | Performed by: INTERNAL MEDICINE

## 2024-05-24 PROCEDURE — 33234 REMOVAL OF PACEMAKER SYSTEM: CPT | Performed by: INTERNAL MEDICINE

## 2024-05-24 PROCEDURE — 3700000002 HC GENERAL ANESTHESIA TIME - EACH INCREMENTAL 1 MINUTE: Performed by: INTERNAL MEDICINE

## 2024-05-24 PROCEDURE — 71045 X-RAY EXAM CHEST 1 VIEW: CPT

## 2024-05-24 PROCEDURE — C1760 CLOSURE DEV, VASC: HCPCS | Performed by: INTERNAL MEDICINE

## 2024-05-24 PROCEDURE — 2020000001 HC ICU ROOM DAILY

## 2024-05-24 PROCEDURE — 3600000008 HC OR TIME - EACH INCREMENTAL 1 MINUTE - PROCEDURE LEVEL THREE: Performed by: INTERNAL MEDICINE

## 2024-05-24 PROCEDURE — C1893 INTRO/SHEATH, FIXED,NON-PEEL: HCPCS | Performed by: INTERNAL MEDICINE

## 2024-05-24 PROCEDURE — 3E0102A INTRODUCTION OF ANTI-INFECTIVE ENVELOPE INTO SUBCUTANEOUS TISSUE, OPEN APPROACH: ICD-10-PCS | Performed by: INTERNAL MEDICINE

## 2024-05-24 PROCEDURE — C1781 MESH (IMPLANTABLE): HCPCS | Performed by: INTERNAL MEDICINE

## 2024-05-24 PROCEDURE — 2500000004 HC RX 250 GENERAL PHARMACY W/ HCPCS (ALT 636 FOR OP/ED): Mod: JG | Performed by: ANESTHESIOLOGIST ASSISTANT

## 2024-05-24 PROCEDURE — 02PA3MZ REMOVAL OF CARDIAC LEAD FROM HEART, PERCUTANEOUS APPROACH: ICD-10-PCS | Performed by: INTERNAL MEDICINE

## 2024-05-24 DEVICE — IMPLANTABLE DEVICE: Type: IMPLANTABLE DEVICE | Site: CHEST | Status: FUNCTIONAL

## 2024-05-24 RX ORDER — FLUTICASONE PROPIONATE 50 MCG
1 SPRAY, SUSPENSION (ML) NASAL 2 TIMES DAILY
Status: DISCONTINUED | OUTPATIENT
Start: 2024-05-24 | End: 2024-05-25 | Stop reason: HOSPADM

## 2024-05-24 RX ORDER — ETOMIDATE 2 MG/ML
INJECTION INTRAVENOUS AS NEEDED
Status: DISCONTINUED | OUTPATIENT
Start: 2024-05-24 | End: 2024-05-24

## 2024-05-24 RX ORDER — ONDANSETRON HYDROCHLORIDE 2 MG/ML
INJECTION, SOLUTION INTRAVENOUS AS NEEDED
Status: DISCONTINUED | OUTPATIENT
Start: 2024-05-24 | End: 2024-05-24

## 2024-05-24 RX ORDER — MIDAZOLAM HYDROCHLORIDE 1 MG/ML
INJECTION, SOLUTION INTRAMUSCULAR; INTRAVENOUS AS NEEDED
Status: DISCONTINUED | OUTPATIENT
Start: 2024-05-24 | End: 2024-05-24

## 2024-05-24 RX ORDER — ROCURONIUM BROMIDE 10 MG/ML
INJECTION, SOLUTION INTRAVENOUS AS NEEDED
Status: DISCONTINUED | OUTPATIENT
Start: 2024-05-24 | End: 2024-05-24

## 2024-05-24 RX ORDER — FENTANYL CITRATE 50 UG/ML
INJECTION, SOLUTION INTRAMUSCULAR; INTRAVENOUS AS NEEDED
Status: DISCONTINUED | OUTPATIENT
Start: 2024-05-24 | End: 2024-05-24

## 2024-05-24 RX ORDER — LOSARTAN POTASSIUM 50 MG/1
50 TABLET ORAL DAILY
Status: DISCONTINUED | OUTPATIENT
Start: 2024-05-24 | End: 2024-05-25 | Stop reason: HOSPADM

## 2024-05-24 RX ORDER — SODIUM CHLORIDE, SODIUM LACTATE, POTASSIUM CHLORIDE, CALCIUM CHLORIDE 600; 310; 30; 20 MG/100ML; MG/100ML; MG/100ML; MG/100ML
INJECTION, SOLUTION INTRAVENOUS CONTINUOUS PRN
Status: DISCONTINUED | OUTPATIENT
Start: 2024-05-24 | End: 2024-05-24

## 2024-05-24 RX ORDER — CEPHALEXIN 500 MG/1
500 CAPSULE ORAL EVERY 12 HOURS SCHEDULED
Status: DISCONTINUED | OUTPATIENT
Start: 2024-05-24 | End: 2024-05-25 | Stop reason: HOSPADM

## 2024-05-24 RX ORDER — CEFAZOLIN 1 G/1
INJECTION, POWDER, FOR SOLUTION INTRAVENOUS AS NEEDED
Status: DISCONTINUED | OUTPATIENT
Start: 2024-05-24 | End: 2024-05-24

## 2024-05-24 RX ORDER — NOREPINEPHRINE BITARTRATE 0.03 MG/ML
INJECTION, SOLUTION INTRAVENOUS CONTINUOUS PRN
Status: DISCONTINUED | OUTPATIENT
Start: 2024-05-24 | End: 2024-05-24

## 2024-05-24 RX ORDER — BUPIVACAINE HYDROCHLORIDE 2.5 MG/ML
INJECTION, SOLUTION INFILTRATION; PERINEURAL AS NEEDED
Status: DISCONTINUED | OUTPATIENT
Start: 2024-05-24 | End: 2024-05-24 | Stop reason: HOSPADM

## 2024-05-24 RX ORDER — METOPROLOL TARTRATE 25 MG/1
25 TABLET, FILM COATED ORAL 2 TIMES DAILY
Status: DISCONTINUED | OUTPATIENT
Start: 2024-05-24 | End: 2024-05-25

## 2024-05-24 RX ORDER — VANCOMYCIN HYDROCHLORIDE 1 G/20ML
INJECTION, POWDER, LYOPHILIZED, FOR SOLUTION INTRAVENOUS AS NEEDED
Status: DISCONTINUED | OUTPATIENT
Start: 2024-05-24 | End: 2024-05-24 | Stop reason: HOSPADM

## 2024-05-24 RX ORDER — PROPOFOL 10 MG/ML
INJECTION, EMULSION INTRAVENOUS AS NEEDED
Status: DISCONTINUED | OUTPATIENT
Start: 2024-05-24 | End: 2024-05-24

## 2024-05-24 RX ORDER — LABETALOL HYDROCHLORIDE 5 MG/ML
INJECTION, SOLUTION INTRAVENOUS AS NEEDED
Status: DISCONTINUED | OUTPATIENT
Start: 2024-05-24 | End: 2024-05-24

## 2024-05-24 RX ORDER — LIDOCAINE HYDROCHLORIDE 20 MG/ML
INJECTION, SOLUTION INFILTRATION; PERINEURAL AS NEEDED
Status: DISCONTINUED | OUTPATIENT
Start: 2024-05-24 | End: 2024-05-24

## 2024-05-24 RX ORDER — SODIUM CHLORIDE, SODIUM GLUCONATE, SODIUM ACETATE, POTASSIUM CHLORIDE AND MAGNESIUM CHLORIDE 30; 37; 368; 526; 502 MG/100ML; MG/100ML; MG/100ML; MG/100ML; MG/100ML
INJECTION, SOLUTION INTRAVENOUS CONTINUOUS PRN
Status: DISCONTINUED | OUTPATIENT
Start: 2024-05-24 | End: 2024-05-24

## 2024-05-24 RX ORDER — IODIXANOL 320 MG/ML
INJECTION, SOLUTION INTRAVASCULAR AS NEEDED
Status: DISCONTINUED | OUTPATIENT
Start: 2024-05-24 | End: 2024-05-24 | Stop reason: HOSPADM

## 2024-05-24 RX ORDER — PRAVASTATIN SODIUM 10 MG/1
10 TABLET ORAL DAILY
Status: DISCONTINUED | OUTPATIENT
Start: 2024-05-24 | End: 2024-05-25 | Stop reason: HOSPADM

## 2024-05-24 RX ORDER — LOSARTAN POTASSIUM 50 MG/1
50 TABLET ORAL DAILY
Status: DISCONTINUED | OUTPATIENT
Start: 2024-05-24 | End: 2024-05-24

## 2024-05-24 RX ORDER — HYDROMORPHONE HYDROCHLORIDE 1 MG/ML
INJECTION, SOLUTION INTRAMUSCULAR; INTRAVENOUS; SUBCUTANEOUS AS NEEDED
Status: DISCONTINUED | OUTPATIENT
Start: 2024-05-24 | End: 2024-05-24

## 2024-05-24 RX ORDER — ASPIRIN 81 MG/1
81 TABLET ORAL DAILY
COMMUNITY

## 2024-05-24 RX ORDER — ACETAMINOPHEN 500 MG
5 TABLET ORAL NIGHTLY
Status: DISCONTINUED | OUTPATIENT
Start: 2024-05-24 | End: 2024-05-25 | Stop reason: HOSPADM

## 2024-05-24 RX ORDER — FUROSEMIDE 40 MG/1
40 TABLET ORAL DAILY
Status: DISCONTINUED | OUTPATIENT
Start: 2024-05-24 | End: 2024-05-25 | Stop reason: HOSPADM

## 2024-05-24 RX ADMIN — FENTANYL CITRATE 25 MCG: 50 INJECTION, SOLUTION INTRAMUSCULAR; INTRAVENOUS at 11:33

## 2024-05-24 RX ADMIN — LOSARTAN POTASSIUM 50 MG: 50 TABLET, FILM COATED ORAL at 15:33

## 2024-05-24 RX ADMIN — HYDROMORPHONE HYDROCHLORIDE 0.2 MG: 1 INJECTION, SOLUTION INTRAMUSCULAR; INTRAVENOUS; SUBCUTANEOUS at 12:27

## 2024-05-24 RX ADMIN — SODIUM CHLORIDE, POTASSIUM CHLORIDE, SODIUM LACTATE AND CALCIUM CHLORIDE: 600; 310; 30; 20 INJECTION, SOLUTION INTRAVENOUS at 07:31

## 2024-05-24 RX ADMIN — LIDOCAINE HYDROCHLORIDE 100 MG: 20 INJECTION, SOLUTION INFILTRATION; PERINEURAL at 08:29

## 2024-05-24 RX ADMIN — SUGAMMADEX 200 MG: 100 INJECTION, SOLUTION INTRAVENOUS at 12:29

## 2024-05-24 RX ADMIN — Medication 0.02 MCG/KG/MIN: at 10:03

## 2024-05-24 RX ADMIN — ROCURONIUM 100 MG: 50 INJECTION, SOLUTION INTRAVENOUS at 08:29

## 2024-05-24 RX ADMIN — FUROSEMIDE 40 MG: 40 TABLET ORAL at 15:33

## 2024-05-24 RX ADMIN — PRAVASTATIN SODIUM 10 MG: 10 TABLET ORAL at 20:33

## 2024-05-24 RX ADMIN — CEPHALEXIN 500 MG: 500 CAPSULE ORAL at 15:32

## 2024-05-24 RX ADMIN — ONDANSETRON 4 MG: 2 INJECTION INTRAMUSCULAR; INTRAVENOUS at 12:24

## 2024-05-24 RX ADMIN — FENTANYL CITRATE 50 MCG: 50 INJECTION, SOLUTION INTRAMUSCULAR; INTRAVENOUS at 09:42

## 2024-05-24 RX ADMIN — FLUTICASONE PROPIONATE 1 SPRAY: 50 SPRAY, METERED NASAL at 20:34

## 2024-05-24 RX ADMIN — NOREPINEPHRINE BITARTRATE 8 MCG: 1 INJECTION, SOLUTION, CONCENTRATE INTRAVENOUS at 10:03

## 2024-05-24 RX ADMIN — FENTANYL CITRATE 200 MCG: 50 INJECTION, SOLUTION INTRAMUSCULAR; INTRAVENOUS at 08:29

## 2024-05-24 RX ADMIN — ETOMIDATE INJECTION 20 MG: 2 SOLUTION INTRAVENOUS at 08:29

## 2024-05-24 RX ADMIN — FENTANYL CITRATE 25 MCG: 50 INJECTION, SOLUTION INTRAMUSCULAR; INTRAVENOUS at 11:40

## 2024-05-24 RX ADMIN — CEFAZOLIN 2 G: 1 INJECTION, POWDER, FOR SOLUTION INTRAMUSCULAR; INTRAVENOUS at 09:10

## 2024-05-24 RX ADMIN — HYDROMORPHONE HYDROCHLORIDE 0.2 MG: 1 INJECTION, SOLUTION INTRAMUSCULAR; INTRAVENOUS; SUBCUTANEOUS at 12:33

## 2024-05-24 RX ADMIN — METOPROLOL TARTRATE 25 MG: 25 TABLET, FILM COATED ORAL at 15:32

## 2024-05-24 RX ADMIN — HYDROMORPHONE HYDROCHLORIDE 0.4 MG: 1 INJECTION, SOLUTION INTRAMUSCULAR; INTRAVENOUS; SUBCUTANEOUS at 12:06

## 2024-05-24 RX ADMIN — LABETALOL HYDROCHLORIDE 10 MG: 5 INJECTION INTRAVENOUS at 12:28

## 2024-05-24 RX ADMIN — METOPROLOL TARTRATE 25 MG: 25 TABLET, FILM COATED ORAL at 20:33

## 2024-05-24 RX ADMIN — HYDROMORPHONE HYDROCHLORIDE 0.2 MG: 1 INJECTION, SOLUTION INTRAMUSCULAR; INTRAVENOUS; SUBCUTANEOUS at 11:51

## 2024-05-24 RX ADMIN — Medication 5 MG: at 20:33

## 2024-05-24 RX ADMIN — SODIUM CHLORIDE, SODIUM GLUCONATE, SODIUM ACETATE, POTASSIUM CHLORIDE AND MAGNESIUM CHLORIDE: 526; 502; 368; 37; 30 INJECTION, SOLUTION INTRAVENOUS at 07:31

## 2024-05-24 RX ADMIN — PROPOFOL 20 MG: 10 INJECTION, EMULSION INTRAVENOUS at 12:27

## 2024-05-24 RX ADMIN — MIDAZOLAM 2 MG: 1 INJECTION INTRAMUSCULAR; INTRAVENOUS at 07:50

## 2024-05-24 RX ADMIN — PROPOFOL 20 MG: 10 INJECTION, EMULSION INTRAVENOUS at 12:32

## 2024-05-24 SDOH — ECONOMIC STABILITY: HOUSING INSECURITY
IN THE LAST 12 MONTHS, WAS THERE A TIME WHEN YOU DID NOT HAVE A STEADY PLACE TO SLEEP OR SLEPT IN A SHELTER (INCLUDING NOW)?: NO

## 2024-05-24 SDOH — ECONOMIC STABILITY: TRANSPORTATION INSECURITY: IN THE PAST 12 MONTHS, HAS LACK OF TRANSPORTATION KEPT YOU FROM MEDICAL APPOINTMENTS OR FROM GETTING MEDICATIONS?: NO

## 2024-05-24 SDOH — SOCIAL STABILITY: SOCIAL INSECURITY: DO YOU FEEL ANYONE HAS EXPLOITED OR TAKEN ADVANTAGE OF YOU FINANCIALLY OR OF YOUR PERSONAL PROPERTY?: NO

## 2024-05-24 SDOH — HEALTH STABILITY: MENTAL HEALTH: HOW OFTEN DO YOU HAVE A DRINK CONTAINING ALCOHOL?: 2-3 TIMES A WEEK

## 2024-05-24 SDOH — SOCIAL STABILITY: SOCIAL INSECURITY: HAVE YOU HAD THOUGHTS OF HARMING ANYONE ELSE?: NO

## 2024-05-24 SDOH — SOCIAL STABILITY: SOCIAL INSECURITY: WERE YOU ABLE TO COMPLETE ALL THE BEHAVIORAL HEALTH SCREENINGS?: YES

## 2024-05-24 SDOH — HEALTH STABILITY: MENTAL HEALTH: HOW OFTEN DO YOU HAVE 6 OR MORE DRINKS ON ONE OCCASION?: LESS THAN MONTHLY

## 2024-05-24 SDOH — ECONOMIC STABILITY: INCOME INSECURITY: IN THE LAST 12 MONTHS, WAS THERE A TIME WHEN YOU WERE NOT ABLE TO PAY THE MORTGAGE OR RENT ON TIME?: NO

## 2024-05-24 SDOH — SOCIAL STABILITY: SOCIAL INSECURITY: ARE THERE ANY APPARENT SIGNS OF INJURIES/BEHAVIORS THAT COULD BE RELATED TO ABUSE/NEGLECT?: NO

## 2024-05-24 SDOH — ECONOMIC STABILITY: FOOD INSECURITY: HOW HARD IS IT FOR YOU TO PAY FOR THE VERY BASICS LIKE FOOD, HOUSING, MEDICAL CARE, AND HEATING?: NOT HARD AT ALL

## 2024-05-24 SDOH — HEALTH STABILITY: MENTAL HEALTH: HOW OFTEN DO YOU HAVE SIX OR MORE DRINKS ON ONE OCCASION?: LESS THAN MONTHLY

## 2024-05-24 SDOH — ECONOMIC STABILITY: TRANSPORTATION INSECURITY
IN THE PAST 12 MONTHS, HAS THE LACK OF TRANSPORTATION KEPT YOU FROM MEDICAL APPOINTMENTS OR FROM GETTING MEDICATIONS?: NO

## 2024-05-24 SDOH — ECONOMIC STABILITY: HOUSING INSECURITY: IN THE LAST 12 MONTHS, WAS THERE A TIME WHEN YOU WERE NOT ABLE TO PAY THE MORTGAGE OR RENT ON TIME?: NO

## 2024-05-24 SDOH — HEALTH STABILITY: MENTAL HEALTH: HOW MANY STANDARD DRINKS CONTAINING ALCOHOL DO YOU HAVE ON A TYPICAL DAY?: 3 OR 4

## 2024-05-24 SDOH — SOCIAL STABILITY: SOCIAL INSECURITY: DOES ANYONE TRY TO KEEP YOU FROM HAVING/CONTACTING OTHER FRIENDS OR DOING THINGS OUTSIDE YOUR HOME?: NO

## 2024-05-24 SDOH — SOCIAL STABILITY: SOCIAL INSECURITY: DO YOU FEEL UNSAFE GOING BACK TO THE PLACE WHERE YOU ARE LIVING?: NO

## 2024-05-24 SDOH — SOCIAL STABILITY: SOCIAL INSECURITY: ARE YOU OR HAVE YOU BEEN THREATENED OR ABUSED PHYSICALLY, EMOTIONALLY, OR SEXUALLY BY ANYONE?: NO

## 2024-05-24 SDOH — HEALTH STABILITY: PHYSICAL HEALTH: ON AVERAGE, HOW MANY DAYS PER WEEK DO YOU ENGAGE IN MODERATE TO STRENUOUS EXERCISE (LIKE A BRISK WALK)?: 0 DAYS

## 2024-05-24 SDOH — SOCIAL STABILITY: SOCIAL INSECURITY: ABUSE: ADULT

## 2024-05-24 SDOH — ECONOMIC STABILITY: HOUSING INSECURITY: IN THE LAST 12 MONTHS, HOW MANY PLACES HAVE YOU LIVED?: 1

## 2024-05-24 SDOH — HEALTH STABILITY: MENTAL HEALTH: HOW MANY DRINKS CONTAINING ALCOHOL DO YOU HAVE ON A TYPICAL DAY WHEN YOU ARE DRINKING?: 3 OR 4

## 2024-05-24 SDOH — ECONOMIC STABILITY: TRANSPORTATION INSECURITY
IN THE PAST 12 MONTHS, HAS LACK OF TRANSPORTATION KEPT YOU FROM MEETINGS, WORK, OR FROM GETTING THINGS NEEDED FOR DAILY LIVING?: NO

## 2024-05-24 SDOH — ECONOMIC STABILITY: INCOME INSECURITY: HOW HARD IS IT FOR YOU TO PAY FOR THE VERY BASICS LIKE FOOD, HOUSING, MEDICAL CARE, AND HEATING?: NOT HARD AT ALL

## 2024-05-24 SDOH — HEALTH STABILITY: PHYSICAL HEALTH: ON AVERAGE, HOW MANY MINUTES DO YOU ENGAGE IN EXERCISE AT THIS LEVEL?: 0 MIN

## 2024-05-24 SDOH — SOCIAL STABILITY: SOCIAL INSECURITY: HAS ANYONE EVER THREATENED TO HURT YOUR FAMILY OR YOUR PETS?: NO

## 2024-05-24 SDOH — SOCIAL STABILITY: SOCIAL INSECURITY: HAVE YOU HAD ANY THOUGHTS OF HARMING ANYONE ELSE?: NO

## 2024-05-24 ASSESSMENT — PAIN SCALES - GENERAL
PAINLEVEL_OUTOF10: 0 - NO PAIN
PAIN_LEVEL: 1
PAINLEVEL_OUTOF10: 0 - NO PAIN

## 2024-05-24 ASSESSMENT — PAIN - FUNCTIONAL ASSESSMENT
PAIN_FUNCTIONAL_ASSESSMENT: 0-10

## 2024-05-24 ASSESSMENT — COGNITIVE AND FUNCTIONAL STATUS - GENERAL
MOBILITY SCORE: 24
PATIENT BASELINE BEDBOUND: NO
DAILY ACTIVITIY SCORE: 24

## 2024-05-24 ASSESSMENT — LIFESTYLE VARIABLES
AUDIT-C TOTAL SCORE: 5
HOW OFTEN DO YOU HAVE A DRINK CONTAINING ALCOHOL: 2-3 TIMES A WEEK
SKIP TO QUESTIONS 9-10: 0
SKIP TO QUESTIONS 9-10: 0
HOW MANY STANDARD DRINKS CONTAINING ALCOHOL DO YOU HAVE ON A TYPICAL DAY: 3 OR 4
AUDIT-C TOTAL SCORE: 5
HOW OFTEN DO YOU HAVE 6 OR MORE DRINKS ON ONE OCCASION: LESS THAN MONTHLY
AUDIT-C TOTAL SCORE: 5

## 2024-05-24 ASSESSMENT — ENCOUNTER SYMPTOMS
CARDIOVASCULAR NEGATIVE: 1
HEMATOLOGIC/LYMPHATIC NEGATIVE: 1
PSYCHIATRIC NEGATIVE: 1
MUSCULOSKELETAL NEGATIVE: 1
ENDOCRINE NEGATIVE: 1
NEUROLOGICAL NEGATIVE: 1
CONSTITUTIONAL NEGATIVE: 1
GASTROINTESTINAL NEGATIVE: 1
SHORTNESS OF BREATH: 1
EYES NEGATIVE: 1

## 2024-05-24 ASSESSMENT — COLUMBIA-SUICIDE SEVERITY RATING SCALE - C-SSRS
1. IN THE PAST MONTH, HAVE YOU WISHED YOU WERE DEAD OR WISHED YOU COULD GO TO SLEEP AND NOT WAKE UP?: NO
6. HAVE YOU EVER DONE ANYTHING, STARTED TO DO ANYTHING, OR PREPARED TO DO ANYTHING TO END YOUR LIFE?: NO
2. HAVE YOU ACTUALLY HAD ANY THOUGHTS OF KILLING YOURSELF?: NO

## 2024-05-24 ASSESSMENT — ACTIVITIES OF DAILY LIVING (ADL): LACK_OF_TRANSPORTATION: NO

## 2024-05-24 ASSESSMENT — PATIENT HEALTH QUESTIONNAIRE - PHQ9
1. LITTLE INTEREST OR PLEASURE IN DOING THINGS: NOT AT ALL
SUM OF ALL RESPONSES TO PHQ9 QUESTIONS 1 & 2: 0
2. FEELING DOWN, DEPRESSED OR HOPELESS: NOT AT ALL

## 2024-05-24 NOTE — ANESTHESIA PROCEDURE NOTES
Peripheral IV  Date/Time: 5/24/2024 8:35 AM  Inserted by: NISSA Nascimento    Placement  Needle size: 16 G  Laterality: left  Location: hand  Site prep: alcohol  Technique: anatomical landmarks  Attempts: 1

## 2024-05-24 NOTE — OP NOTE
RV lead extraction and upgrade to CRT-D Operative Note     Date: 2024  OR Location: [facility] OR    Name: Ananth Gimenez, : 1955, Age: 68 y.o., MRN: [id number], Sex: male    Diagnosis  Pre-op Diagnosis     * Unspecified diastolic (congestive) heart failure (Multi) [I50.30] Post-op Diagnosis     * Unspecified diastolic (congestive) heart failure (Multi) [I50.30]     Procedures  RV lead extraction and upgrade to CRT-D  38136 - SC RMVL TRANSVNS PM ELTRD 1 LEAD SYS ATR/VENTR    38872- Removal of pacemaker generator    95515- ICD implant    06153- insertion pacer electrode CS venous system    Surgeons      * Fredis Burks - Primary    Resident/Fellow/Other Assistant:  Surgeons and Role:  * No surgeons found with a matching role *    Procedure Summary  Anesthesia: General  ASA: IV  Anesthesia Staff: Anesthesiologist: Emory Gerard MD  C-AA: NISSA Nascimento  Perfusionist: Susan Simpson  Estimated Blood Loss: 20mL  Intra-op Medications:   Administrations occurring from 24 0604 to 24 0604:   Medication Name Total Dose   BUPivacaine HCl (Marcaine) 0.25 % (2.5 mg/mL) injection 75 mg              Anesthesia Record               Intraprocedure I/O Totals          Intake    Norepinephrine Drip 0.00 mL    The total shown is the total volume documented since Anesthesia Start was filed.    Total Intake 0 mL       Output    Urine 145 mL    Total Output 145 mL       Net    Net Volume -145 mL          Specimen: No specimens collected     Staff:   Circulator: Vanna  Scrub Person: Js         Drains and/or Catheters:   Urethral Catheter 14 Fr. (Active)       Tourniquet Times:         Implants:  Implants       Type Name Action Serial No.      Cardiac Pacemaker LEAD, DURATA T, ACTIVE SINGLE COIL, DF4, 58CM - [id number] - [id number] Implanted [id number]     Cardiac Pacemaker PACING LEAD, QUARTET 86CM, MODEL 1457Q LV, DBL BEND - [id number] - [id number] Implanted [id number]     Cardiac Pacemaker  DEFIBRILLATOR, CRT-D, GALLANT  - [id number] - [id number] Implanted [id number]                  Indications: Ananth Gimenez is an 68 y.o. male who is having surgery for Unspecified diastolic (congestive) heart failure (Multi) [I50.30]. Developed pacemaker induced cardiomyopathy, presents for RV lead extraction and device upgrade t oCRT-D    The patient was seen in the preoperative area. The risks, benefits, complications, treatment options, non-operative alternatives, expected recovery and outcomes were discussed with the patient. The possibilities of reaction to medication, pulmonary aspiration, injury to surrounding structures, bleeding, recurrent infection, the need for additional procedures, failure to diagnose a condition, and creating a complication requiring transfusion or operation were discussed with the patient. The patient concurred with the proposed plan, giving informed consent.  The site of surgery was properly noted/marked if necessary per policy. The patient has been actively warmed in preoperative area. Preoperative antibiotics have been ordered and given within 1 hours of incision. Venous thrombosis prophylaxis have been ordered including chemical prophylaxis    Procedure Details: The procedure was performed under general anesthesia. Two right and 1 left femoral venous accesses were obtained. A temporary pacemaker was placed in the RV apex. An Amplatz Super stiff wire was placed in the SVC. The existing pocket in the right pre-pectoral area was incised, and the device delivered.  The device was disconnected from the leads, and the leads were dissected to the tie down sleeves. Access was unsuccessfully attempted. 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 The RV leads were prepped in the usual fashion, and a 12 Fr. laser sheath was used to extract the lead. Fibrosis was noted throughout the  length of both leads. With the initial effort of extraction the lead dislodged from the RV and retracted to the RA. In order to maintain access anew venous access was obtained to the right femoral vein and a goose snare advanced into the RA. The RV lead was snared and retained in position to allow complete progression of the laser sheath into the RA. Then the RV lead was successfully extracted and two long wires advanced through the extraction sheath into the IVC. After extraction, the blood pressure was stable, and  x-ray revealed no effusions. Using a long SJM intravascular sheath and a steerable EP XT [employer] steerable catheter through the Attain left heart delivery system, the coronary sinus (CS) was cannulated. CS venography was performed in RASHAAD and SIMPSON fluoroscopic projections without obvious target veins. A total of about 15 cc of contrast was used. Using an angioplasty wire an ant-lat vein was successfully cannulated and then a pace/sense lead advanced into the vein over the wire. Good function and lead stability was demonstrated there. The leads were connected to an ICD pulse generator and placed into the prepectoral pocket with a TYRx antibiotic pouch and after being irrigated with antibiotic solutions. The wound was closed with layers of absorbable suture. The femoral veins access were removed and hemostasis achieved with VASCADE closure device. The procedure was tolerated well and there were no complications.     Complications:  None; patient tolerated the procedure well.    Disposition: ICU - intubated and hemodynamically stable.  Condition: stable         Additional Details: n/a    Attending Attestation:     Fredis Burks  Phone Number: [phone]

## 2024-05-24 NOTE — ANESTHESIA PREPROCEDURE EVALUATION
PT   CALLED   TESTED  POSITIVE  FOR  COVID   WANTS  TO KNOW  IF  SHE  CAN  GET  AN RX   OR  SHOULD  SHE  GO TO  THE  ER   NO  OPENING  LEFT  TODAY Patient: Ananth Gimenez    Procedure Information       Anesthesia Start Date/Time: 05/24/24 0731    Procedure: PPM Lead Extraction - upgrade CRT-D  vendor-[facility]  henry    Location: [facility] OR 27 / [facility] OR    Surgeons: Fredis Burks MD            Relevant Problems   Cardiac   (+) Hyperlipidemia   (+) Hypertension   (+) Mild CAD   (+) Mitral regurgitation   (+) Nonrheumatic mitral valve stenosis   (+) Pacemaker   (+) Paroxysmal atrial fibrillation (Multi)      Pulmonary   (+) ZIGGY on CPAP      Neuro   (+) Cervical radiculopathy      Endocrine   (+) Obesity      Musculoskeletal   (+) Lumbosacral spinal stenosis       Clinical information reviewed:   Tobacco  Allergies  Meds   Med Hx  Surg Hx   Fam Hx  Soc Hx        NPO Detail:  NPO/Void Status  Date of Last Liquid: 05/24/24  Time of Last Liquid: 0000  Date of Last Solid: 05/23/24  Time of Last Solid: 2000  Last Intake Type: Clear fluids; Solid meal         Physical Exam    Airway  Mallampati: II  TM distance: >3 FB  Neck ROM: limited     Cardiovascular    Dental    Pulmonary    Abdominal            Anesthesia Plan    History of general anesthesia?: yes  History of complications of general anesthesia?: no    ASA 4     general   (Dwarfism. [place], [place], CVP, CESAR)  Anesthetic plan and risks discussed with patient.    Plan discussed with CAA.

## 2024-05-24 NOTE — PROGRESS NOTES
1/1 CICU    EP  05/24 PPM lead extraction and upgrade CRT-D    DC PLAN  TBD - Care Transitions is following to develop a safe & supportive discharge plan in collaboration with multidisciplinary team (&) patient/family/significant others.      PAYOR   Medicare A/B    COMPLETED  (X) Daily ongoing review of patient via chart and/or (M-F) IDT rounds  (X) 05/24 - New to CICU/author - EPIC reviewed. Unable to see for DC/SDOH assessments.     Aissatou Tejada (LSW, MSW)

## 2024-05-24 NOTE — ANESTHESIA PROCEDURE NOTES
Peripheral IV  Date/Time: 5/24/2024 8:30 AM  Inserted by: NISSA Nascimento    Placement  Needle size: 16 G  Laterality: right  Location: hand  Site prep: alcohol  Technique: anatomical landmarks  Attempts: 1

## 2024-05-24 NOTE — H&P
History Of Present Illness  Ananth Gimenez is a 68 y.o. male presenting with  h/o AVR complicated with CHB s/p PPM, PAF s/p RFA. Was recently seen for concern of new cardiomyopathy with LVEF 25-30% by echocardiogram. Underwent a pharmacologic stress test which showed no evidence of ischemia.   Patient presented today post RV lead extraction and CRTD upgrade.    Patient has ZIGGY, on bipap at night.     Past Medical History  Past Medical History:   Diagnosis Date    Heart disease     Hyperlipidemia     Hypertension     Irregular heart beat        Surgical History  Past Surgical History:   Procedure Laterality Date    INSERT / REPLACE / REMOVE PACEMAKER      LUMBAR LAMINECTOMY  11/05/2013    Laminectomy Lumbar        Social History  He reports that he has never smoked. He has never used smokeless tobacco. No history on file for alcohol use and drug use.    Family History  No family history on file.     Allergies  Atorvastatin    Review of Systems   Constitutional: Negative.    HENT: Negative.     Eyes: Negative.    Respiratory:  Positive for shortness of breath.    Cardiovascular: Negative.    Gastrointestinal: Negative.    Endocrine: Negative.    Genitourinary: Negative.    Musculoskeletal: Negative.    Skin: Negative.    Neurological: Negative.    Hematological: Negative.    Psychiatric/Behavioral: Negative.          Physical Exam  Constitutional:       General: He is not in acute distress.     Appearance: Normal appearance. He is not ill-appearing.   HENT:      Head: Atraumatic.   Eyes:      General: No scleral icterus.     Extraocular Movements: Extraocular movements intact.      Conjunctiva/sclera: Conjunctivae normal.      Pupils: Pupils are equal, round, and reactive to light.   Cardiovascular:      Rate and Rhythm: Bradycardia present.      Heart sounds: Normal heart sounds. No murmur heard.  Pulmonary:      Effort: Pulmonary effort is normal. No respiratory distress.      Breath sounds: Normal breath  "sounds. No stridor. No wheezing, rhonchi or rales.   Abdominal:      General: Bowel sounds are normal. There is no distension.      Palpations: Abdomen is soft.      Tenderness: There is no abdominal tenderness. There is no guarding or rebound.   Musculoskeletal:      Right lower leg: No edema.      Left lower leg: No edema.   Skin:     Coloration: Skin is not jaundiced or pale.      Findings: No rash.   Neurological:      Mental Status: He is alert and oriented to person, place, and time. Mental status is at baseline.   Psychiatric:         Mood and Affect: Mood normal.         Behavior: Behavior normal.         Thought Content: Thought content normal.         Judgment: Judgment normal.          Last Recorded Vitals  Blood pressure 163/80, pulse 88, temperature 36.5 °C (97.7 °F), temperature source Temporal, resp. rate 20, height 1.27 m (4' 2\"), weight 70 kg (154 lb 5.2 oz), SpO2 97%.    Relevant Results   Results for orders placed or performed during the hospital encounter of 05/24/24 (from the past 24 hour(s))   Prepare RBC: 4 Units   Result Value Ref Range    PRODUCT CODE N0005R31     Unit Number [id number]     Unit ABO A     Unit RH POS     XM INTEP COMP     Dispense Status XM     Blood Expiration Date June 05, 2024 23:59 EDT     PRODUCT BLOOD TYPE 6200     UNIT VOLUME 350     PRODUCT CODE Y5235D95     Unit Number [id number]     Unit ABO A     Unit RH POS     XM INTEP COMP     Dispense Status XM     Blood Expiration Date June 05, 2024 23:59 EDT     PRODUCT BLOOD TYPE 6200     UNIT VOLUME 350     PRODUCT CODE D9512C26     Unit Number [id number]     Unit ABO A     Unit RH POS     XM INTEP COMP     Dispense Status XM     Blood Expiration Date June 05, 2024 23:59 EDT     PRODUCT BLOOD TYPE 6200     UNIT VOLUME 350     PRODUCT CODE H0536S08     Unit Number [id number]     Unit ABO A     Unit RH POS     XM INTEP COMP     Dispense Status XM     Blood Expiration Date June 07, 2024 23:59 EDT     " PRODUCT BLOOD TYPE 6200     UNIT VOLUME 350    Verify ABO/Rh Group Test (VERAB)   Result Value Ref Range    ABO TYPE A     Rh TYPE POS         Nuclear stress test (4-19-24)  No evidence of inducible myocardial ischemia.  A fixed large-sized  moderate to severe perfusion defects involving apex, apical to mid  inferior wall, apical to mid inferolateral as well as apical to mid  inferoseptal wall, slightly worsening in comparison to prior cardiac  stress test from 12/03/2019, likely representing prior infarction.  2. The left ventricle is normal in size.  3. Gated images demonstrate global hypokinesis with akinesis in the  apex and apical to mid inferior wall with a decreased post-stress LV  EF estimated at 38%, was 46% previously      Echocardiography (5-24-24): Left ventricular systolic function is severely decreased with a 25-30% estimated ejection fraction.   2. Spectral Doppler shows an abnormal pattern of left ventricular diastolic filling.   3. There is reduced right ventricular systolic function.   4. The left atrium is severely dilated.   5. Absent A-wave on MV spectral Doppler tracing, consistent with atrial fibrillation.   6. Mild aortic valve stenosis.   7. Mild aortic valve regurgitation.   8. There is global hypokinesis of the left ventricle with minor regional variations.                Assessment/Plan   Principal Problem:    AVB (atrioventricular block)      68 y old male patient, htn, DL, Miguel on bipap at night, s/p AVR complicated with complete heart block, requiring pacemaker insertion. Paroxysmal AFIB post ablation. Presented to cicu post upgrade to crtd BECAUSE OF CARDIOMYOPATHY 25-30%  Post procedure, patient awake , oriented, hemodynamically stable, good oxygen saturation.      Plan:  Will monitor in cicu till tomorrow, probably discharge.    Neurology:  Nothing to do.    Cardiovascular:  Eh 23-30%.  Post upgrade from pacemaker to CRTD.  Patient post AVR developped complete heart block requiring  pacemaker.   Afib on metoprolol and DOAC.  -will monitor post procedure for hemodynamics  -bed rest for 2 h  -Tomorrow recheck of the device by structural and cxr  -Resume anti-coagulation in 2 days  -keflex for 7 days post-op  -probably discharge tomorrow    Renal:   crea 1.16  Monitor urine output    Pulmonary:  Miguel ON BIPAP at night.          Code status: full code  Surrogate:    Perla Gimenez (Spouse)  [phone] (Mobile)                      Andrés Elder MD

## 2024-05-24 NOTE — ANESTHESIA PROCEDURE NOTES
Airway  Date/Time: 5/24/2024 8:32 AM  Urgency: elective    Airway not difficult    Staffing  Performed: NISSA and LIZY   Authorized by: Emory Gerard MD    Performed by: NISSA Nascimento  Patient location during procedure: OR    Indications and Patient Condition  Indications for airway management: anesthesia and airway protection  Spontaneous Ventilation: absent  Sedation level: deep  Preoxygenated: yes  Patient position: sniffing  Mask difficulty assessment: 2 - vent by mask + OA or adjuvant +/- NMBA    Final Airway Details  Final airway type: endotracheal airway      Successful airway: ETT  Cuffed: yes   Successful intubation technique: video laryngoscopy  Facilitating devices/methods: intubating stylet  Endotracheal tube insertion site: oral  Blade: Rai  Blade size: #4  ETT size (mm): 7.5  Cormack-Lehane Classification: grade III - view of epiglottis only  Placement verified by: chest auscultation, capnometry and palpation of cuff   Measured from: lips  ETT to lips (cm): 22  Number of attempts at approach: 1  Ventilation between attempts: none  Number of other approaches attempted: 0

## 2024-05-24 NOTE — PROGRESS NOTES
Pharmacy Medication History Review    Ananth Gimenez is a 68 y.o. male admitted for AVB (atrioventricular block). Pharmacy reviewed the patient's eyekh-[id number] medications and allergies for accuracy.    The list below reflects the updated PTA list. Comments regarding how patient may be taking medications differently can be found in the Admit Orders Activity  Prior to Admission Medications   Prescriptions Last Dose Informant Patient Reported?   Klor-Con M20 20 mEq ER tablet 5/23/2024 Self, Spouse/Significant Other Yes   Sig: Take 1 tablet (20 mEq) by mouth once daily at bedtime.   aspirin 81 mg EC tablet  Spouse/Significant Other Yes   Sig: Take 1 tablet (81 mg) by mouth once daily.   dabigatran etexilate (Pradaxa) 75 mg capsule  Self, Spouse/Significant Other No   Sig: Take 1 capsule (75 mg) by mouth 2 times a day. Do not crush or chew.   Patient not taking: Reported on 4/30/2024   fluticasone propionate (FLONASE ALLERGY RELIEF NASL) 5/24/2024 Spouse/Significant Other Yes   Sig: Administer into affected nostril(s). 2 sprays in each nostril twice a day  as needed   furosemide (Lasix) 40 mg tablet 5/23/2024 Self, Spouse/Significant Other Yes   Sig: Take 1 tablet (40 mg) by mouth once daily. 40 mg in the AM,   losartan (Cozaar) 50 mg tablet 5/23/2024 Spouse/Significant Other No   Sig: TAKE 1 TABLET BY MOUTH EVERY DAY   metoprolol tartrate (Lopressor) 25 mg tablet 5/23/2024 Self, Spouse/Significant Other Yes   Sig: Take 1 tablet (25 mg) by mouth 2 times a day.   multivitamin with minerals (multivit-min-iron fum-folic ac) tablet 5/23/2024 Self, Spouse/Significant Other Yes   Sig: Take 1 tablet by mouth every other day.   pravastatin (Pravachol) 10 mg tablet 5/23/2024 Spouse/Significant Other No   Sig: TAKE 1 TABLET BY MOUTH EVERY DAY   rivaroxaban (Xarelto) 20 mg tablet 5/23/2024 Spouse/Significant Other No   Sig: Take 1 tablet (20 mg) by mouth once daily in the morning. Take with food.   sildenafil (Revatio)  20 mg tablet Past Week Self, Spouse/Significant Other Yes   Sig: Take 1 tablet (20 mg) by mouth if needed.      Facility-Administered Medications: None       The list below reflects the updated allergy list. Please review each documented allergy for additional clarification and justification.  Allergies  Reviewed by Colt Peacock RN on 5/24/2024        Severity Reactions Comments    Atorvastatin Low Myalgia Pain in extremities. Tolerate low dose pravastatin            Patient was unable to be assessed for M2B at discharge. Pharmacy has been updated to [facility] ([facility]).    Sources used to complete the med history include out patient fill history, OARRS, and patient spouse interview    Unable to speak with patient, spoke with wife over the phone to confirm medication list  Cardiology- Electrophysiology 4/30/2024 note  Cardiology note 9/6/2023 (amiodarone stoppage)    Medications ADDED:  Aspirin 81mg  Medications CHANGED:  N/a  Medications REMOVED:   N/a      Below are additional concerns with the patient's PTA list.  Patient reported to be taking baby aspirin at home, wife has bottle at home added into chart PTA list today    Ali Naserallah, PharmD  Transitions of Care Pharmacist  [facility]  Please reach out via Secure Chat for questions, or if no response call [employer] or [facility]

## 2024-05-24 NOTE — ANESTHESIA PROCEDURE NOTES
Arterial Line:    Date/Time: 5/24/2024 8:10 AM    Staffing  Performed: NISSA, attending and LIZY   Authorized by: Emory Gerard MD    Performed by: NISSA Nascimento    An arterial line was placed. Procedure performed using ultrasound guidance.in the OR for the following indication(s): continuous blood pressure monitoring and blood sampling needed.    A 20 gauge (size), 1 and 3/4 inch (length), Angiocath (type) catheter was placed into the Right radial artery, secured by Tegadejessee   Seldingandree technique used.  Events:  greater than 3 attempts.      Additional notes:  Radial artery puncture successful with two attempts but difficulty in passing canula and wire. Right radial artery cannulated successfully using micropuncture wire and 20g non-safety angiocath.

## 2024-05-24 NOTE — H&P
"History Of Present Illness  Ananth Gimenez is a 68 y.o. male presenting with hx pof AVR C/B COMPLETE HEART BLCOK, PAF s/p RFA , with new drop in EF will need RV lead ex and CRTD upgrade .     Past Medical History  Past Medical History:   Diagnosis Date    Heart disease     Hyperlipidemia     Hypertension     Irregular heart beat        Surgical History  Past Surgical History:   Procedure Laterality Date    INSERT / REPLACE / REMOVE PACEMAKER      LUMBAR LAMINECTOMY  11/05/2013    Laminectomy Lumbar        Social History  He reports that he has never smoked. He has never used smokeless tobacco. No history on file for alcohol use and drug use.    Family History  No family history on file.     Allergies  Atorvastatin    Review of Systems   All other systems reviewed and are negative.       Physical Exam  Constitutional:       Appearance: Normal appearance.   Pulmonary:      Effort: Pulmonary effort is normal.      Breath sounds: Normal breath sounds.   Abdominal:      General: Abdomen is flat.      Palpations: Abdomen is soft.   Neurological:      General: No focal deficit present.      Mental Status: He is alert.          Last Recorded Vitals  Blood pressure 163/80, pulse 73, temperature 36.4 °C (97.5 °F), temperature source Temporal, resp. rate 16, height 1.27 m (4' 2\"), weight 70.4 kg (155 lb 3.3 oz), SpO2 96%.    Relevant Results             Assessment/Plan   Active Problems:  There are no active Hospital Problems.             I spent 30 minutes in the professional and overall care of this patient.      Kim Gomez MD    "

## 2024-05-24 NOTE — ANESTHESIA POSTPROCEDURE EVALUATION
Patient: Ananth Gimenez    Procedure Summary       Date: 05/24/24 Room / Location: [facility] OR 27 / [facility] [place]    Anesthesia Start: 0731 Anesthesia Stop: 1305    Procedure: RV lead extraction and upgrade to CRT-D Diagnosis:       Unspecified diastolic (congestive) heart failure (Multi)      (Unspecified diastolic (congestive) heart failure (Multi) [I50.30])    Surgeons: Fredis Burks MD Responsible Provider: Emory Gerard MD    Anesthesia Type: general ASA Status: 4            Anesthesia Type: general    Vitals Value Taken Time   /66 05/24/24 1350   Temp 36.5 °C (97.7 °F) 05/24/24 1305   Pulse 89 05/24/24 1349   Resp 20 05/24/24 1349   SpO2 93 % 05/24/24 1349   Vitals shown include unfiled device data.    Anesthesia Post Evaluation    Patient location during evaluation: ICU  Patient participation: complete - patient participated  Level of consciousness: awake and alert  Pain score: 1  Pain management: adequate  Airway patency: patent  Cardiovascular status: acceptable  Respiratory status: acceptable and face mask  Hydration status: acceptable  Postoperative Nausea and Vomiting: none        No notable events documented.

## 2024-05-25 ENCOUNTER — PHARMACY VISIT (OUTPATIENT)
Dept: PHARMACY | Facility: CLINIC | Age: 69
End: 2024-05-25
Payer: COMMERCIAL

## 2024-05-25 ENCOUNTER — APPOINTMENT (OUTPATIENT)
Dept: RADIOLOGY | Facility: HOSPITAL | Age: 69
DRG: 277 | End: 2024-05-25
Payer: MEDICARE

## 2024-05-25 ENCOUNTER — APPOINTMENT (OUTPATIENT)
Dept: CARDIOLOGY | Facility: HOSPITAL | Age: 69
DRG: 277 | End: 2024-05-25
Payer: MEDICARE

## 2024-05-25 VITALS
RESPIRATION RATE: 26 BRPM | TEMPERATURE: 97.7 F | DIASTOLIC BLOOD PRESSURE: 51 MMHG | BODY MASS INDEX: 30.3 KG/M2 | HEIGHT: 60 IN | HEART RATE: 75 BPM | OXYGEN SATURATION: 96 % | SYSTOLIC BLOOD PRESSURE: 103 MMHG | WEIGHT: 154.32 LBS

## 2024-05-25 LAB
ALBUMIN SERPL BCP-MCNC: 3.4 G/DL (ref 3.4–5)
ANION GAP SERPL CALC-SCNC: 11 MMOL/L (ref 10–20)
BASOPHILS # BLD AUTO: 0.03 X10*3/UL (ref 0–0.1)
BASOPHILS NFR BLD AUTO: 0.3 %
BUN SERPL-MCNC: 18 MG/DL (ref 6–23)
CALCIUM SERPL-MCNC: 8.1 MG/DL (ref 8.6–10.6)
CHLORIDE SERPL-SCNC: 102 MMOL/L (ref 98–107)
CO2 SERPL-SCNC: 25 MMOL/L (ref 21–32)
CREAT SERPL-MCNC: 1.09 MG/DL (ref 0.5–1.3)
EGFRCR SERPLBLD CKD-EPI 2021: 74 ML/MIN/1.73M*2
EOSINOPHIL # BLD AUTO: 0.08 X10*3/UL (ref 0–0.7)
EOSINOPHIL NFR BLD AUTO: 0.8 %
ERYTHROCYTE [DISTWIDTH] IN BLOOD BY AUTOMATED COUNT: 13 % (ref 11.5–14.5)
GLUCOSE SERPL-MCNC: 132 MG/DL (ref 74–99)
HCT VFR BLD AUTO: 38.8 % (ref 41–52)
HGB BLD-MCNC: 12.5 G/DL (ref 13.5–17.5)
IMM GRANULOCYTES # BLD AUTO: 0.06 X10*3/UL (ref 0–0.7)
IMM GRANULOCYTES NFR BLD AUTO: 0.6 % (ref 0–0.9)
LYMPHOCYTES # BLD AUTO: 0.5 X10*3/UL (ref 1.2–4.8)
LYMPHOCYTES NFR BLD AUTO: 5 %
MAGNESIUM SERPL-MCNC: 2.09 MG/DL (ref 1.6–2.4)
MCH RBC QN AUTO: 31.6 PG (ref 26–34)
MCHC RBC AUTO-ENTMCNC: 32.2 G/DL (ref 32–36)
MCV RBC AUTO: 98 FL (ref 80–100)
MONOCYTES # BLD AUTO: 0.85 X10*3/UL (ref 0.1–1)
MONOCYTES NFR BLD AUTO: 8.4 %
NEUTROPHILS # BLD AUTO: 8.58 X10*3/UL (ref 1.2–7.7)
NEUTROPHILS NFR BLD AUTO: 84.9 %
NRBC BLD-RTO: 0 /100 WBCS (ref 0–0)
PHOSPHATE SERPL-MCNC: 3.4 MG/DL (ref 2.5–4.9)
PLATELET # BLD AUTO: 152 X10*3/UL (ref 150–450)
POTASSIUM SERPL-SCNC: 4 MMOL/L (ref 3.5–5.3)
RBC # BLD AUTO: 3.96 X10*6/UL (ref 4.5–5.9)
SODIUM SERPL-SCNC: 134 MMOL/L (ref 136–145)
TSH SERPL-ACNC: 1.54 MIU/L (ref 0.44–3.98)
WBC # BLD AUTO: 10.1 X10*3/UL (ref 4.4–11.3)

## 2024-05-25 PROCEDURE — 84443 ASSAY THYROID STIM HORMONE: CPT

## 2024-05-25 PROCEDURE — 99024 POSTOP FOLLOW-UP VISIT: CPT | Performed by: INTERNAL MEDICINE

## 2024-05-25 PROCEDURE — 80069 RENAL FUNCTION PANEL: CPT

## 2024-05-25 PROCEDURE — 85025 COMPLETE CBC W/AUTO DIFF WBC: CPT

## 2024-05-25 PROCEDURE — 2500000005 HC RX 250 GENERAL PHARMACY W/O HCPCS

## 2024-05-25 PROCEDURE — 93010 ELECTROCARDIOGRAM REPORT: CPT | Performed by: INTERNAL MEDICINE

## 2024-05-25 PROCEDURE — 83735 ASSAY OF MAGNESIUM: CPT

## 2024-05-25 PROCEDURE — 71046 X-RAY EXAM CHEST 2 VIEWS: CPT | Performed by: RADIOLOGY

## 2024-05-25 PROCEDURE — 2500000004 HC RX 250 GENERAL PHARMACY W/ HCPCS (ALT 636 FOR OP/ED)

## 2024-05-25 PROCEDURE — 2500000001 HC RX 250 WO HCPCS SELF ADMINISTERED DRUGS (ALT 637 FOR MEDICARE OP)

## 2024-05-25 PROCEDURE — 71046 X-RAY EXAM CHEST 2 VIEWS: CPT

## 2024-05-25 PROCEDURE — 2500000002 HC RX 250 W HCPCS SELF ADMINISTERED DRUGS (ALT 637 FOR MEDICARE OP, ALT 636 FOR OP/ED)

## 2024-05-25 PROCEDURE — RXMED WILLOW AMBULATORY MEDICATION CHARGE

## 2024-05-25 PROCEDURE — 84100 ASSAY OF PHOSPHORUS: CPT

## 2024-05-25 PROCEDURE — 37799 UNLISTED PX VASCULAR SURGERY: CPT

## 2024-05-25 PROCEDURE — 93005 ELECTROCARDIOGRAM TRACING: CPT

## 2024-05-25 RX ORDER — OXYCODONE HYDROCHLORIDE 5 MG/1
5 TABLET ORAL ONCE
Status: COMPLETED | OUTPATIENT
Start: 2024-05-25 | End: 2024-05-25

## 2024-05-25 RX ORDER — OXYCODONE HYDROCHLORIDE 5 MG/1
TABLET ORAL
Status: COMPLETED
Start: 2024-05-25 | End: 2024-05-25

## 2024-05-25 RX ORDER — SPIRONOLACTONE 25 MG/1
12.5 TABLET ORAL DAILY
Status: DISCONTINUED | OUTPATIENT
Start: 2024-05-25 | End: 2024-05-25 | Stop reason: HOSPADM

## 2024-05-25 RX ORDER — METOPROLOL SUCCINATE 50 MG/1
50 TABLET, EXTENDED RELEASE ORAL DAILY
Qty: 30 TABLET | Refills: 3 | Status: SHIPPED | OUTPATIENT
Start: 2024-05-25 | End: 2024-06-06 | Stop reason: SDUPTHER

## 2024-05-25 RX ORDER — CEPHALEXIN 500 MG/1
500 CAPSULE ORAL 2 TIMES DAILY
Qty: 12 CAPSULE | Refills: 0 | Status: SHIPPED | OUTPATIENT
Start: 2024-05-25 | End: 2024-06-05 | Stop reason: ALTCHOICE

## 2024-05-25 RX ORDER — METOPROLOL SUCCINATE 50 MG/1
50 TABLET, EXTENDED RELEASE ORAL DAILY
Status: DISCONTINUED | OUTPATIENT
Start: 2024-05-25 | End: 2024-05-25 | Stop reason: HOSPADM

## 2024-05-25 RX ORDER — SPIRONOLACTONE 25 MG/1
12.5 TABLET ORAL DAILY
Qty: 15 TABLET | Refills: 0 | Status: SHIPPED | OUTPATIENT
Start: 2024-05-25 | End: 2024-05-25

## 2024-05-25 RX ORDER — FLUTICASONE PROPIONATE 50 MCG
1 SPRAY, SUSPENSION (ML) NASAL 2 TIMES DAILY
Qty: 16 G | Refills: 12 | Status: SHIPPED | OUTPATIENT
Start: 2024-05-25

## 2024-05-25 RX ORDER — SPIRONOLACTONE 25 MG/1
12.5 TABLET ORAL DAILY
Qty: 45 TABLET | Refills: 1 | Status: SHIPPED | OUTPATIENT
Start: 2024-05-25 | End: 2024-05-25 | Stop reason: HOSPADM

## 2024-05-25 RX ORDER — CEPHALEXIN 500 MG/1
500 CAPSULE ORAL 2 TIMES DAILY
Qty: 12 CAPSULE | Refills: 0 | Status: SHIPPED | OUTPATIENT
Start: 2024-05-25 | End: 2024-05-25

## 2024-05-25 RX ORDER — SPIRONOLACTONE 25 MG/1
12.5 TABLET ORAL DAILY
Qty: 15 TABLET | Refills: 3 | Status: SHIPPED | OUTPATIENT
Start: 2024-05-25 | End: 2024-06-06 | Stop reason: SDUPTHER

## 2024-05-25 RX ORDER — ACETAMINOPHEN 500 MG
1000 TABLET ORAL EVERY 6 HOURS PRN
Qty: 30 TABLET | Refills: 0 | Status: SHIPPED | OUTPATIENT
Start: 2024-05-25 | End: 2024-06-24

## 2024-05-25 RX ORDER — METOPROLOL SUCCINATE 50 MG/1
50 TABLET, EXTENDED RELEASE ORAL DAILY
Start: 2024-05-25 | End: 2024-05-25

## 2024-05-25 RX ORDER — METOPROLOL SUCCINATE 50 MG/1
50 TABLET, EXTENDED RELEASE ORAL DAILY
Qty: 30 TABLET | Refills: 11 | Status: SHIPPED | OUTPATIENT
Start: 2024-05-25 | End: 2024-05-25

## 2024-05-25 RX ORDER — ACETAMINOPHEN 500 MG
5 TABLET ORAL NIGHTLY PRN
Status: CANCELLED | OUTPATIENT
Start: 2024-05-25

## 2024-05-25 RX ORDER — METOPROLOL SUCCINATE 50 MG/1
50 TABLET, EXTENDED RELEASE ORAL DAILY
Qty: 90 TABLET | Refills: 0 | Status: SHIPPED | OUTPATIENT
Start: 2024-05-25 | End: 2024-05-25 | Stop reason: HOSPADM

## 2024-05-25 RX ORDER — FUROSEMIDE 40 MG/1
40 TABLET ORAL DAILY
Start: 2024-05-25

## 2024-05-25 RX ADMIN — OXYCODONE HYDROCHLORIDE 5 MG: 5 TABLET ORAL at 00:11

## 2024-05-25 RX ADMIN — CEPHALEXIN 500 MG: 500 CAPSULE ORAL at 14:02

## 2024-05-25 RX ADMIN — CEPHALEXIN 500 MG: 500 CAPSULE ORAL at 03:04

## 2024-05-25 RX ADMIN — METOPROLOL TARTRATE 25 MG: 25 TABLET, FILM COATED ORAL at 08:16

## 2024-05-25 RX ADMIN — FUROSEMIDE 40 MG: 40 TABLET ORAL at 08:16

## 2024-05-25 RX ADMIN — METOPROLOL SUCCINATE 50 MG: 50 TABLET, EXTENDED RELEASE ORAL at 14:00

## 2024-05-25 RX ADMIN — CALCIUM CHLORIDE 1 G: 100 INJECTION INTRAVENOUS; INTRAVENTRICULAR at 11:12

## 2024-05-25 RX ADMIN — SPIRONOLACTONE 12.5 MG: 25 TABLET, FILM COATED ORAL at 13:59

## 2024-05-25 RX ADMIN — LOSARTAN POTASSIUM 50 MG: 50 TABLET, FILM COATED ORAL at 08:16

## 2024-05-25 ASSESSMENT — COGNITIVE AND FUNCTIONAL STATUS - GENERAL
MOBILITY SCORE: 24
DAILY ACTIVITIY SCORE: 24

## 2024-05-25 ASSESSMENT — PAIN SCALES - GENERAL
PAINLEVEL_OUTOF10: 0 - NO PAIN
PAINLEVEL_OUTOF10: 0 - NO PAIN
PAINLEVEL_OUTOF10: 7
PAINLEVEL_OUTOF10: 0 - NO PAIN

## 2024-05-25 ASSESSMENT — PAIN - FUNCTIONAL ASSESSMENT
PAIN_FUNCTIONAL_ASSESSMENT: 0-10

## 2024-05-25 NOTE — DISCHARGE SUMMARY
Discharge Diagnosis  AVB (atrioventricular block)  POST upgrade from pacemaker to CRTD.    Issues Requiring Follow-Up  -FOLLOW-up with structural heart cardiologist  -follow-up with echocardiography in 3 months      Test Results Pending At Discharge  Pending Labs       No current pending labs.            Hospital Course  Ananth Gimenez is a 68 y.o. male presenting with  h/o AVR complicated with CHB s/p PPM, PAF s/p RFA. Was recently seen for concern of new cardiomyopathy with LVEF 25-30% by echocardiogram. Underwent a pharmacologic stress test which showed no evidence of ischemia.   Patient presented today post RV lead extraction and CRTD upgrade.  Procedure went smoothly without any complication.     Patient has ZIGGY, on bipap at night.  Patient had a smooth of hospitalization, without any complications.      Cxr post procedure:  Trace left pleural effusion  Vascular prominence bilaterally which may to some degree be  accentuated by the projection and degree of inspiration.       Medications:   Keflex 500 mg bid for 7 days  Lasix 40 mg daily  Cozaar 50 mg daily  Metoprolol XL 50 mg daily  sPironolactone 12.5 orally daily  Pravastatin 10 mg daily      Follow-up:   -With cardiologist  -with pulmonary for ZIGGY  Follow-up with echocardiography.  Follow-up for optimization of heart failure treatment    Pertinent Physical Exam At Time of Discharge  Physical Exam  Constitutional:       General: He is not in acute distress.     Appearance: Normal appearance. He is not ill-appearing.   HENT:      Head: Normocephalic and atraumatic.   Eyes:      General: No scleral icterus.     Extraocular Movements: Extraocular movements intact.      Conjunctiva/sclera: Conjunctivae normal.      Pupils: Pupils are equal, round, and reactive to light.   Cardiovascular:      Rate and Rhythm: Normal rate and regular rhythm.      Heart sounds: Normal heart sounds. No murmur heard.  Pulmonary:      Effort: Pulmonary effort is normal. No  respiratory distress.      Breath sounds: Normal breath sounds. No stridor. No wheezing, rhonchi or rales.   Abdominal:      General: Abdomen is flat. Bowel sounds are normal. There is no distension.      Palpations: Abdomen is soft.      Tenderness: There is no abdominal tenderness. There is no guarding or rebound.   Musculoskeletal:      Right lower leg: No edema.      Left lower leg: No edema.   Skin:     Coloration: Skin is not jaundiced or pale.      Findings: No rash.   Neurological:      Mental Status: He is alert and oriented to person, place, and time. Mental status is at baseline.   Psychiatric:         Mood and Affect: Mood normal.         Behavior: Behavior normal.         Thought Content: Thought content normal.         Judgment: Judgment normal.         Home Medications     Medication List      ASK your doctor about these medications     aspirin 81 mg EC tablet   dabigatran etexilate 75 mg capsule; Commonly known as: Pradaxa; Take 1   capsule (75 mg) by mouth 2 times a day. Do not crush or chew.   FLONASE ALLERGY RELIEF NASL   furosemide 40 mg tablet; Commonly known as: Lasix   Klor-Con M20 20 mEq ER tablet; Generic drug: potassium chloride CR   losartan 50 mg tablet; Commonly known as: Cozaar; TAKE 1 TABLET BY MOUTH   EVERY DAY   metoprolol tartrate 25 mg tablet; Commonly known as: Lopressor   multivitamin with minerals tablet   pravastatin 10 mg tablet; Commonly known as: Pravachol; TAKE 1 TABLET BY   MOUTH EVERY DAY   sildenafil 20 mg tablet; Commonly known as: Revatio   Xarelto 20 mg tablet; Generic drug: rivaroxaban; Take 1 tablet (20 mg)   by mouth once daily in the morning. Take with food.       Outpatient Follow-Up  Future Appointments   Date Time Provider Department Center   6/6/2024 11:00 AM [id number]   6/24/2024  9:00 AM [id number] LUIS ANTONIO Elder MD

## 2024-05-25 NOTE — CARE PLAN
The patient's goals for the shift include      The clinical goals for the shift include remain HDS throughout shift    Problem: Skin  Goal: Decreased wound size/increased tissue granulation at next dressing change  Outcome: Progressing     Problem: Skin  Goal: Participates in plan/prevention/treatment measures  Outcome: Progressing     Problem: Skin  Goal: Prevent/manage excess moisture  Outcome: Progressing     Problem: Skin  Goal: Prevent/minimize sheer/friction injuries  Outcome: Progressing

## 2024-05-25 NOTE — CARE PLAN
The patient's goals for the shift include going home today once medically cleared.     The clinical goals for the shift include remain HDS throughout shift.   Problem: Skin  Goal: Decreased wound size/increased tissue granulation at next dressing change  Outcome: Progressing  Goal: Participates in plan/prevention/treatment measures  Outcome: Progressing  Goal: Prevent/manage excess moisture  Outcome: Progressing  Goal: Prevent/minimize sheer/friction injuries  Outcome: Progressing  Goal: Promote/optimize nutrition  Outcome: Progressing  Goal: Promote skin healing  Outcome: Progressing     Problem: Fall/Injury  Goal: Not fall by end of shift  Outcome: Progressing  Goal: Be free from injury by end of the shift  Outcome: Progressing  Goal: Verbalize understanding of personal risk factors for fall in the hospital  Outcome: Progressing  Goal: Verbalize understanding of risk factor reduction measures to prevent injury from fall in the home  Outcome: Progressing  Goal: Use assistive devices by end of the shift  Outcome: Progressing  Goal: Pace activities to prevent fatigue by end of the shift  Outcome: Progressing

## 2024-05-25 NOTE — DISCHARGE INSTRUCTIONS
Dear Mr Gimenez ,     You presented to our institution for replacement of your pacemaker by CRTD.  The procedure went very smoothly, without any complication, your device was checked the next day of the procedure and parameters adjusted.  You were hemodynamically stable without complaints or arrhythmias during your stay.  -You will need to restart your xarelto 48 h after the procedure.  -You will need to take keflex 1 tablet morning and evening for additional 6 days (this is an antibiotic you need to take to prevent infection of the new device).  -You need to follow-up with your interventional cardiologist for further follow-up and check-up on your newly inserted device.

## 2024-05-25 NOTE — CARE PLAN
Problem: Skin  Goal: Decreased wound size/increased tissue granulation at next dressing change  5/25/2024 0333 by Suzanna Hui RN  Outcome: Progressing  5/25/2024 0332 by Suzanna Hui RN  Outcome: Progressing     Problem: Skin  Goal: Participates in plan/prevention/treatment measures  5/25/2024 0333 by Suzanna Hui RN  Outcome: Progressing  5/25/2024 0332 by Suzanna Hui RN  Outcome: Progressing     Problem: Skin  Goal: Prevent/manage excess moisture  5/25/2024 0333 by Suzanna Hui RN  Outcome: Progressing  5/25/2024 0332 by Suzanna Hui RN  Outcome: Progressing     Problem: Skin  Goal: Prevent/minimize sheer/friction injuries  5/25/2024 0333 by Suzanna Hui RN  Outcome: Progressing  5/25/2024 0332 by Suzanna Hui RN  Outcome: Progressing   The patient's goals for the shift include      The clinical goals for the shift include remain HDS throughout shift

## 2024-05-25 NOTE — NURSING NOTE
Patient does not want to stay attached to the telemetry monitor while waiting on his medication to be delivered. He was educated and is aware of the risks associated at this time. Patient denies needs at this time.

## 2024-05-25 NOTE — CARE PLAN
Problem: Skin  Goal: Decreased wound size/increased tissue granulation at next dressing change  5/25/2024 1411 by Lucie Mckeon RN  Outcome: Met  5/25/2024 0935 by Lucie Mckeon RN  Outcome: Progressing  Goal: Participates in plan/prevention/treatment measures  5/25/2024 1411 by Lucie Mckeon RN  Outcome: Met  5/25/2024 0935 by Lucie Mckeon RN  Outcome: Progressing  Goal: Prevent/manage excess moisture  5/25/2024 1411 by Lucie Mckeon RN  Outcome: Met  5/25/2024 0935 by Lucie Mckeon RN  Outcome: Progressing  Goal: Prevent/minimize sheer/friction injuries  5/25/2024 1411 by Lucie Mckeon RN  Outcome: Met  5/25/2024 0935 by Lucie Mckeon RN  Outcome: Progressing  Goal: Promote/optimize nutrition  5/25/2024 1411 by Lucie Mckeon RN  Outcome: Met  5/25/2024 0935 by Lucie Mckeon RN  Outcome: Progressing  Goal: Promote skin healing  5/25/2024 1411 by Lucie Mckeon RN  Outcome: Met  5/25/2024 0935 by Lucie Mckeon RN  Outcome: Progressing     Problem: Fall/Injury  Goal: Not fall by end of shift  5/25/2024 1411 by Lucie Mckeon RN  Outcome: Met  5/25/2024 0935 by Lucie Mckeon RN  Outcome: Progressing  Goal: Be free from injury by end of the shift  5/25/2024 1411 by Lucie Mckeon RN  Outcome: Met  5/25/2024 0935 by Lucie Mckeon RN  Outcome: Progressing  Goal: Verbalize understanding of personal risk factors for fall in the hospital  5/25/2024 1411 by Lucie Mckeon RN  Outcome: Met  5/25/2024 0935 by Lucie Mckeon RN  Outcome: Progressing  Goal: Verbalize understanding of risk factor reduction measures to prevent injury from fall in the home  5/25/2024 1411 by Lucie Mckeon RN  Outcome: Met  5/25/2024 0935 by Lucie Mckeon RN  Outcome: Progressing  Goal: Use assistive devices by end of the shift  5/25/2024 1411 by Lucie Mckeon RN  Outcome: Met  5/25/2024 0935 by Lucie Mckeon RN  Outcome: Progressing  Goal: Pace activities to prevent fatigue by end of the shift  5/25/2024 1411 by Lucie Mckeon RN  Outcome: Met  5/25/2024 0935 by [facility]  Mike RN  Outcome: Progressing

## 2024-05-25 NOTE — HOSPITAL COURSE
Ananth Gimenez is a 68 y.o. male presenting with  h/o AVR complicated with CHB s/p PPM, PAF s/p RFA. Was recently seen for concern of new cardiomyopathy with LVEF 25-30% by echocardiogram. Underwent a pharmacologic stress test which showed no evidence of ischemia.   Patient presented today post RV lead extraction and CRTD upgrade.  Procedure went smoothly without any complication.     Patient has ZIGGY, on bipap at night.  Patient had a smooth of hospitalization, without any complications.      Cxr post procedure:  Trace left pleural effusion  Vascular prominence bilaterally which may to some degree be  accentuated by the projection and degree of inspiration.       Medications:   Keflex 500 mg bid for 7 days  Lasix 40 mg daily  Cozaar 50 mg daily  Metoprolol XL 50 mg daily  sPironolactone 12.5 orally daily  Pravastatin 10 mg daily      Follow-up:   -With cardiologist  -with pulmonary for ZIGGY

## 2024-05-28 NOTE — ADDENDUM NOTE
Addendum  created 05/28/24 1059 by Susan Simpson    Attestation recorded in Intraprocedure (Perfusion), Intraprocedure Attestations filed (Perfusion)

## 2024-05-29 ENCOUNTER — OFFICE VISIT (OUTPATIENT)
Dept: CARDIOLOGY | Facility: HOSPITAL | Age: 69
End: 2024-05-29
Payer: MEDICARE

## 2024-05-29 ENCOUNTER — APPOINTMENT (OUTPATIENT)
Dept: CARDIOLOGY | Facility: CLINIC | Age: 69
End: 2024-05-29
Payer: MEDICARE

## 2024-05-29 VITALS
HEART RATE: 85 BPM | SYSTOLIC BLOOD PRESSURE: 144 MMHG | WEIGHT: 156 LBS | DIASTOLIC BLOOD PRESSURE: 71 MMHG | OXYGEN SATURATION: 96 % | BODY MASS INDEX: 43.87 KG/M2

## 2024-05-29 DIAGNOSIS — Z95.0 PACEMAKER: Primary | ICD-10-CM

## 2024-05-29 PROCEDURE — 1036F TOBACCO NON-USER: CPT | Performed by: INTERNAL MEDICINE

## 2024-05-29 PROCEDURE — 99213 OFFICE O/P EST LOW 20 MIN: CPT | Performed by: INTERNAL MEDICINE

## 2024-05-29 PROCEDURE — 1159F MED LIST DOCD IN RCRD: CPT | Performed by: INTERNAL MEDICINE

## 2024-05-29 PROCEDURE — 3077F SYST BP >= 140 MM HG: CPT | Performed by: INTERNAL MEDICINE

## 2024-05-29 PROCEDURE — 99024 POSTOP FOLLOW-UP VISIT: CPT | Performed by: INTERNAL MEDICINE

## 2024-05-29 PROCEDURE — 1111F DSCHRG MED/CURRENT MED MERGE: CPT | Performed by: INTERNAL MEDICINE

## 2024-05-29 PROCEDURE — 3078F DIAST BP <80 MM HG: CPT | Performed by: INTERNAL MEDICINE

## 2024-05-29 RX ORDER — OXYCODONE AND ACETAMINOPHEN 5; 325 MG/1; MG/1
1 TABLET ORAL EVERY 8 HOURS PRN
Qty: 15 TABLET | Refills: 0 | Status: SHIPPED | OUTPATIENT
Start: 2024-05-29 | End: 2024-06-06 | Stop reason: ALTCHOICE

## 2024-05-29 RX ORDER — OXYCODONE AND ACETAMINOPHEN 5; 325 MG/1; MG/1
1 TABLET ORAL EVERY 8 HOURS PRN
Status: DISCONTINUED | OUTPATIENT
Start: 2024-05-29 | End: 2024-05-29

## 2024-05-29 ASSESSMENT — ENCOUNTER SYMPTOMS
LOSS OF SENSATION IN FEET: 0
DEPRESSION: 0
OCCASIONAL FEELINGS OF UNSTEADINESS: 0

## 2024-05-29 NOTE — PROGRESS NOTES
Referred by Dr. Miles ref. provider found provider found for No chief complaint on file.       Ananth Gimenez is a 68 y.o. year old male patient with h/o MVR and CHB s/p PPM implant. The patient developed pacemaker induced cardiomyopathy and is s/p RV lead extraction and upgrade to CRT-D.  He has pain at the implant site and presents for evaluation.     PMHx/PSHx: As above    FamHx: unremarkable     Allergies:  Allergies   Allergen Reactions    Atorvastatin Myalgia     Pain in extremities. Tolerate low dose pravastatin        Review of Systems    Constitutional: not feeling tired.   Eyes: no eyesight problems.   ENT: no hearing loss and no nosebleeds.   Cardiovascular: no intermittent leg claudication and as noted in HPI.   Respiratory: no chronic cough and no shortness of breath.   Gastrointestinal: no change in bowel habits and no blood in stools.   Genitourinary: no urinary frequency and no hematuria.   Skin: no skin rashes.   Neurological: no seizures and no frequent falls.   Psychiatric: no depression and not suicidal.   All other systems have been reviewed and are negative for complaint.     Outpatient Medications:  Current Outpatient Medications   Medication Instructions    acetaminophen (TYLENOL) 1,000 mg, oral, Every 6 hours PRN    aspirin 81 mg, oral, Daily    cephalexin (KEFLEX) 500 mg, oral, 2 times daily    fluticasone (Flonase Allergy Relief) 50 mcg/actuation nasal spray 1 spray, Each Nostril, 2 times daily, Shake gently. Before first use, prime pump. After use, clean tip and replace cap.    fluticasone propionate (FLONASE ALLERGY RELIEF NASL) nasal, 2 sprays in each nostril twice a day    furosemide (LASIX) 40 mg, oral, Daily, 40 mg in the AM,     furosemide (LASIX) 40 mg, oral, Daily    Klor-Con M20 20 mEq ER tablet 20 mEq, oral, Nightly    losartan (COZAAR) 50 mg, oral, Daily    metoprolol succinate XL (TOPROL-XL) 50 mg, oral, Daily, Do not crush or chew.    multivitamin with minerals  (multivit-min-iron fum-folic ac) tablet 1 tablet, oral, Every other day    pravastatin (PRAVACHOL) 10 mg, oral, Daily    rivaroxaban (XARELTO) 20 mg, oral, Every morning, Take with food.<BR>START TOMORROW    sildenafil (REVATIO) 20 mg, oral, As needed    spironolactone (ALDACTONE) 12.5 mg, oral, Daily         Last Recorded Vitals:      5/25/2024     7:00 AM 5/25/2024     8:00 AM 5/25/2024     9:00 AM 5/25/2024    10:00 AM 5/25/2024    11:00 AM 5/25/2024    12:00 PM 5/25/2024     1:00 PM   Vitals   Systolic       103   Diastolic       51   Heart Rate 83 82 75 78 77 72 75   Temp  36.4 °C (97.5 °F)    36.5 °C (97.7 °F)    Resp 25 35 15 23 27 32 26    Visit Vitals  Smoking Status Never        Physical Exam:  Constitutional: alert and in no acute distress.   Eyes: no erythema, swelling or discharge from the eye .   Neck: neck is supple, symmetric, trachea midline, no masses  and no thyromegaly .   Pulmonary: no increased work of breathing or signs of respiratory distress  and lungs clear to auscultation.    Cardiovascular: carotid pulses 2+ bilaterally with no bruit , JVP was normal, no thrills , regular rhythm, normal S1 and S2, no murmurs , pedal pulses 2+ bilaterally  and no edema .   Abdomen: abdomen non-tender, no masses  and no hepatomegaly .   Skin: skin warm and dry, normal skin turgor .   Psychiatric judgment and insight is normal  and oriented to person, place and time .        Assessment/Plan   Problem List Items Addressed This Visit    None      Ananth Gimenez is a 68 y.o. year old male patient with h/o MVR and CHB s/p PPM implant. The patient developed pacemaker induced cardiomyopathy and is s/p RV lead extraction and upgrade to CRT-D.  He has pain at the implant site and presents for evaluation.   He has a hematoma at the implant site. The pocket itself has a mild to mod fluid collection. Will prescribe percocet for pain management and continue observation. Will get him scheduled next week for follow  up. He was also instructed to contact me if the fluid size increases.         Fredis Burks MD  Cardiac Electrophysiology      Thank you very much for allowing me to participate in the care of this pleasant patient. Please do not hesitate to contact me with any further questions or concerns regarding his care.    **Disclaimer: This note was dictated by speech recognition, and every effort has been made to prevent any error in transcription, however minor errors may be present**

## 2024-05-31 LAB
ATRIAL RATE: 83 BPM
Q ONSET: 207 MS
QRS COUNT: 14 BEATS
QRS DURATION: 146 MS
QT INTERVAL: 434 MS
QTC CALCULATION(BAZETT): 509 MS
QTC FREDERICIA: 483 MS
R AXIS: -64 DEGREES
T AXIS: 129 DEGREES
T OFFSET: 424 MS
VENTRICULAR RATE: 83 BPM

## 2024-06-03 ENCOUNTER — HOSPITAL ENCOUNTER (OUTPATIENT)
Dept: CARDIOLOGY | Facility: CLINIC | Age: 69
Discharge: HOME | End: 2024-06-03
Payer: MEDICARE

## 2024-06-03 DIAGNOSIS — Z95.0 PRESENCE OF CARDIAC PACEMAKER: ICD-10-CM

## 2024-06-03 DIAGNOSIS — I44.30 UNSPECIFIED ATRIOVENTRICULAR BLOCK: ICD-10-CM

## 2024-06-05 ENCOUNTER — OFFICE VISIT (OUTPATIENT)
Dept: CARDIOLOGY | Facility: HOSPITAL | Age: 69
End: 2024-06-05
Payer: MEDICARE

## 2024-06-05 VITALS
WEIGHT: 153 LBS | HEART RATE: 105 BPM | OXYGEN SATURATION: 96 % | SYSTOLIC BLOOD PRESSURE: 159 MMHG | BODY MASS INDEX: 30.04 KG/M2 | DIASTOLIC BLOOD PRESSURE: 81 MMHG | HEIGHT: 60 IN

## 2024-06-05 DIAGNOSIS — I48.0 PAROXYSMAL ATRIAL FIBRILLATION (MULTI): ICD-10-CM

## 2024-06-05 DIAGNOSIS — T81.30XA WOUND DISRUPTION, INITIAL ENCOUNTER: Primary | ICD-10-CM

## 2024-06-05 PROCEDURE — 99214 OFFICE O/P EST MOD 30 MIN: CPT | Performed by: INTERNAL MEDICINE

## 2024-06-05 PROCEDURE — 1111F DSCHRG MED/CURRENT MED MERGE: CPT | Performed by: INTERNAL MEDICINE

## 2024-06-05 PROCEDURE — 1036F TOBACCO NON-USER: CPT | Performed by: INTERNAL MEDICINE

## 2024-06-05 PROCEDURE — 3079F DIAST BP 80-89 MM HG: CPT | Performed by: INTERNAL MEDICINE

## 2024-06-05 PROCEDURE — 1159F MED LIST DOCD IN RCRD: CPT | Performed by: INTERNAL MEDICINE

## 2024-06-05 PROCEDURE — 3077F SYST BP >= 140 MM HG: CPT | Performed by: INTERNAL MEDICINE

## 2024-06-05 PROCEDURE — 99024 POSTOP FOLLOW-UP VISIT: CPT | Performed by: INTERNAL MEDICINE

## 2024-06-05 RX ORDER — CEPHALEXIN 500 MG/1
CAPSULE ORAL
Qty: 14 CAPSULE | Refills: 0 | Status: SHIPPED | OUTPATIENT
Start: 2024-06-05

## 2024-06-05 ASSESSMENT — ENCOUNTER SYMPTOMS
DEPRESSION: 0
LOSS OF SENSATION IN FEET: 0
OCCASIONAL FEELINGS OF UNSTEADINESS: 0

## 2024-06-05 NOTE — PROGRESS NOTES
Referred by Dr. Miles ref. provider found provider found for   Chief Complaint   Patient presents with    PAF     CM        Ananth Gimenez is a 68 y.o. year old male patient with h/o CHB 2nd to Mitral valve surgery, pacemaker induced cardiomyopathy s/p device upgrade to CRT-D. Presents for follow up on progress of mod size hematoma at the device pocket.      PMHx/PSHx: As above    FamHx: unremarkable     Allergies:  Allergies   Allergen Reactions    Atorvastatin Myalgia     Pain in extremities. Tolerate low dose pravastatin        Review of Systems    Constitutional: not feeling tired.   Eyes: no eyesight problems.   ENT: no hearing loss and no nosebleeds.   Cardiovascular: no intermittent leg claudication and as noted in HPI.   Respiratory: no chronic cough and no shortness of breath.   Gastrointestinal: no change in bowel habits and no blood in stools.   Genitourinary: no urinary frequency and no hematuria.   Skin: no skin rashes.   Neurological: no seizures and no frequent falls.   Psychiatric: no depression and not suicidal.   All other systems have been reviewed and are negative for complaint.     Outpatient Medications:  Current Outpatient Medications   Medication Instructions    acetaminophen (TYLENOL) 1,000 mg, oral, Every 6 hours PRN    aspirin 81 mg, oral, Daily    fluticasone (Flonase Allergy Relief) 50 mcg/actuation nasal spray 1 spray, Each Nostril, 2 times daily, Shake gently. Before first use, prime pump. After use, clean tip and replace cap.    fluticasone propionate (FLONASE ALLERGY RELIEF NASL) nasal, 2 sprays in each nostril twice a day    furosemide (LASIX) 40 mg, oral, Daily    Klor-Con M20 20 mEq ER tablet 20 mEq, oral, Nightly    losartan (COZAAR) 50 mg, oral, Daily    metoprolol succinate XL (TOPROL-XL) 50 mg, oral, Daily, Do not crush or chew.    multivitamin with minerals (multivit-min-iron fum-folic ac) tablet 1 tablet, oral, Every other day    pravastatin (PRAVACHOL) 10 mg, oral, Daily     rivaroxaban (XARELTO) 20 mg, oral, Every morning, Take with food.<BR>START TOMORROW    sildenafil (REVATIO) 20 mg, oral, As needed    spironolactone (ALDACTONE) 12.5 mg, oral, Daily         Last Recorded Vitals:      5/25/2024     8:00 AM 5/25/2024     9:00 AM 5/25/2024    10:00 AM 5/25/2024    11:00 AM 5/25/2024    12:00 PM 5/25/2024     1:00 PM 5/29/2024    11:39 AM   Vitals   Systolic      103 144   Diastolic      51 71   Heart Rate 82 75 78 77 72 75 85   Temp 36.4 °C (97.5 °F)    36.5 °C (97.7 °F)     Resp 35 15 23 27 32 26    Weight (lb)       156   BMI       43.87 kg/m2   BSA (m2)       1.58 m2   Visit Report       Report    Visit Vitals  Smoking Status Never        Physical Exam:  Constitutional: alert and in no acute distress.   Eyes: no erythema, swelling or discharge from the eye .   Neck: neck is supple, symmetric, trachea midline, no masses  and no thyromegaly .   Pulmonary: no increased work of breathing or signs of respiratory distress  and lungs clear to auscultation.    Cardiovascular: carotid pulses 2+ bilaterally with no bruit , JVP was normal, no thrills , regular rhythm, normal S1 and S2, no murmurs , pedal pulses 2+ bilaterally  and no edema .   Abdomen: abdomen non-tender, no masses  and no hepatomegaly .   Skin: skin warm and dry, normal skin turgor .   Psychiatric judgment and insight is normal  and oriented to person, place and time .        Assessment/Plan   Problem List Items Addressed This Visit             ICD-10-CM    Paroxysmal atrial fibrillation (Multi) - Primary I48.0       Ananth Gimenez is a 68 y.o. year old male patient with h/o CHB 2nd to Mitral valve surgery, pacemaker induced cardiomyopathy s/p device upgrade to CRT-D. Presents for follow up on progress of mod size hematoma at the device pocket.    Hematoma size stable or even smaller. Small superficial opening of the incision in the outer corner which was cleaned, glued with derma-bond and covered with steri strips.  The patient will do one more week of Keflex and was instructed to contact my office if the oozing resumes. Otherwise will follow up in 1 week.       Fredis Burks MD  Cardiac Electrophysiology      Thank you very much for allowing me to participate in the care of this pleasant patient. Please do not hesitate to contact me with any further questions or concerns regarding his care.    **Disclaimer: This note was dictated by speech recognition, and every effort has been made to prevent any error in transcription, however minor errors may be present**

## 2024-06-06 ENCOUNTER — TELEMEDICINE (OUTPATIENT)
Dept: PHARMACY | Facility: HOSPITAL | Age: 69
End: 2024-06-06
Payer: MEDICARE

## 2024-06-06 DIAGNOSIS — I48.0 PAROXYSMAL ATRIAL FIBRILLATION (MULTI): Primary | ICD-10-CM

## 2024-06-06 DIAGNOSIS — I50.30 HEART FAILURE WITH PRESERVED LEFT VENTRICULAR FUNCTION (HFPEF) (MULTI): ICD-10-CM

## 2024-06-06 RX ORDER — METOPROLOL SUCCINATE 50 MG/1
50 TABLET, EXTENDED RELEASE ORAL DAILY
Qty: 90 TABLET | Refills: 1 | Status: SHIPPED | OUTPATIENT
Start: 2024-06-06 | End: 2024-06-07 | Stop reason: SDUPTHER

## 2024-06-06 RX ORDER — SPIRONOLACTONE 25 MG/1
12.5 TABLET ORAL DAILY
Qty: 45 TABLET | Refills: 1 | Status: SHIPPED | OUTPATIENT
Start: 2024-06-06 | End: 2024-06-07 | Stop reason: SDUPTHER

## 2024-06-06 NOTE — PROGRESS NOTES
Pharmacist Clinic: Anticoagulation Management  Ananth Gimenez was referred to the Clinical Pharmacy Team for their anticoagulation management.    Referring Provider:  Da Mann MD    Last Appointment w/ Pharmacist: 03/07/24  Pharmacist Name: Joshua Chiu (Cathy), PharmD  _______________________________________________________________________    PHARMACY ASSESSMENT    Review of Past Appointment:  - Patient was previously receiving Xarelto from alternate sources, but expressed interest in [facility] PAP for future prescriptions. Patient screened for and enrolled in [facility]AP. Approved  04/09/24.  - Patient identified as candidate for reduced dosing of Xarelto pending repeat lab work. Continued patient on Xarelto 20 mg daily.     Interim Hx  - Has pacemaker, was placed w/ valve replacement approx 2 years ago. 5/24/24 admitted for extraction of pacemaker and reinsertion. Xarelto resumed day after discharge 5/29/24. Has moderate size hematoma at the device pocket, following with cardiology weekly.       MEDICATION RECONCILIATION  (S/p discharge)  Added: none  Changed:   Patient taking differently:  Prescribed as 0.5 tablet (12.5 mg PO daily), patient taking full tablet of spironolactone 25 for total daily dose of 25 mg  Removed:   Flonase allergy relief nasal spray - duplicate  Oxycodone-Acetaminophen - therapy completed      RELEVANT LAB RESULTS    Lab Results   Component Value Date    BILITOT 1.0 07/05/2022    CALCIUM 8.1 (L) 05/25/2024    CO2 25 05/25/2024     05/25/2024    CREATININE 1.09 05/25/2024    GLUCOSE 132 (H) 05/25/2024    ALKPHOS 103 07/05/2022    K 4.0 05/25/2024    PROT 6.8 07/05/2022     (L) 05/25/2024    AST 27 07/05/2022    ALT 34 07/05/2022    BUN 18 05/25/2024    ANIONGAP 11 05/25/2024    MG 2.09 05/25/2024    PHOS 3.4 05/25/2024    ALBUMIN 3.4 05/25/2024    GFRMALE 54 (A) 03/07/2023     Lab Results   Component Value Date    TRIG 62 08/17/2019    CHOL 129 08/17/2019    HDL 45.5  "08/17/2019     No results found for: \"BMCBC\", \"CBCDIF\"   _______________________________________________________________________    ANTICOAGULATION ASSESSMENT    RISK:    10-Year ASCVD Risk Score  The ASCVD Risk score (Hema JOSE, et al., 2019) failed to calculate for the following reasons:    Cannot find a previous HDL lab    Cannot find a previous total cholesterol lab    [id number] Score   Appropriate? Yes - dx of afib    [id number] Score: 3   Age 65-74 (+1)   Sex Male (+0)   CHF hx Yes (+1)   Hypertension hx Yes (+1)   Stroke/TIA/thromboembolism hx No (+0)   Vascular disease hx   (prior MI, peripheral artery disease, aortic plaque) No (+0)   Diabetes hx No (+0)       DIAGNOSIS:     Indication: prevention of nonvalvular atrial fibrilliation stroke and systemic embolism  Duration: Patient is projected to be on anticoagulation indefinitely (risk vs benefit)      CURRENT PHARMACOTHERAPY:     - Xarelto 20 mg daily   Dose Evaluation: appropriate - renal function has improved. CrCl with IBW = 47 mL/min, CrCl with AdjBW = 55 mL/min.  Patient denies any adverse events, bleeding or bruising. Appropriate to continue with routine renal function monitoring.    SCr (mg/dL) - 1.09   Weight (kg) - 69.4   Age (years) - 68   CrCl (calculated with Cockcroft Gault and Adj BW) - 54.88       UPDATE ON PHARMACOTHERAPY:    Affordability/Accessibility: enrolled in [facility] PAP  Adherence/Organization: medications recently changed w/ hospital discharge, patient expresses some confusion with current regimen  Adverse Effects: denies any side effects    Recent Hospitalizations: Planned admission for procedure (s/p RV lead extraction CRTD upgrade)  Recent Falls/Trauma: denies  Changes in Tobacco or Alcohol Intake:    - Tobacco: never, reports no change   - Alcohol: hx of occasional use, reports no change    _______________________________________________________________________    DISCUSSION/NOTES:    Overall, doing well on anticoagulation " w/ Xarelto 20 mg. Denies side effects, falls, hospitalizations (only planned procedure), trauma. Endorses most of the time taking with food, but sometimes not. Counseled patient on importance of taking with food and consistency.   Xarelto dosing - renal function has improved. CrCl with IBW = 47 mL/min, CrCl with AdjBW = 55 mL/min.  Patient denies any adverse events, bleeding or bruising. Appropriate to continue with routine renal function monitoring. Recommend every 3-6 months.   Patient endorses   Patient expresses concerns for recent changes in medication regimen s/p discharge.   New medication: spironolactone  Patient taking more than prescribed - prescribed 12.5 mg PO daily, taking 25 mg daily  Monitoring  Discussed how spironolactone can cause increased potassium levels due to MOA. Discussed differences between K+ sparing diuretic vs. Loop diuretic and affect of K+ with K+ sparing diuretic vs. Potassium supplementation.  Counseled patient to continue all medications as prescribed, but to recheck labs for renal function and potassium levels after upcoming follow up with cardiology.   Counseled patient on s/sx of potassium disturbance and when to contact healthcare provider/seek emergency care.       _______________________________________________________________________    PATIENT EDUCATION/GOALS    - Counseled patient on MOA, expectations, duration of therapy, contraindications, administration, and monitoring parameters  - Counseled patient of side effects that are indicative of bleeding such as dark tarry stool, unexplainable bruising, or vomiting up a coffee ground like substance  - Answered all patient questions and concerns    _______________________________________________________________________    RECOMMENDATIONS/PLAN    Anticoagulation - continue current treatment regimen. No dose adjustment necessary due to recent renal function improvement. Recommend increased frequency of renal function monitoring to  Q3-6 months until stable with close monitoring of any adverse bleeding events. Instructed patient to obtain repeat lab work after 6/18 cardiology appt to monitor K+ level and renal function. Plan for close follow up in 1 month for review of lab results and medication adherence.    Continue Xarelto 20 mg PO daily  Adherence/Organization  Reviewed medication list s/p discharge. Patient endorses taking most medications as prescribed except for spironolactone. Patient was taking full tablet of the 25 mg instead of half tablet 12.5 mg.   Instructed patient to split tablets in half even if not scored. Recommended pill splitter or asking pharmacy to fill scored tablets if available.     Follow Up  Next Cardiology Appointment: 6/18/24  Clinical Pharmacist follow up: 7/11/24, Virtual  VAF/Application Expiration: Yes    Date: 4/9/25      LUIS JUNIOR PharmD    Verbal consent to manage patient's drug therapy was obtained from the patient . They were informed they may decline to participate or withdraw from participation in pharmacy services at any time.    Continue all meds under the continuation of care with the referring provider and clinical pharmacy team.

## 2024-06-06 NOTE — Clinical Note
Flaco Mann. Anticoagulation - continue current treatment regimen. No dose adjustment necessary due to recent renal function improvement. Recommend increased frequency of renal function monitoring to Q3-6 months until stable with close monitoring of any adverse bleeding events. Instructed patient to obtain repeat lab work after 6/18 cardiology appt to monitor K+ level and renal function. Plan for close follow up in 1 month for review of lab results and medication adherence.

## 2024-06-07 DIAGNOSIS — I48.0 PAROXYSMAL ATRIAL FIBRILLATION (MULTI): Primary | ICD-10-CM

## 2024-06-07 DIAGNOSIS — I50.30 HEART FAILURE WITH PRESERVED LEFT VENTRICULAR FUNCTION (HFPEF) (MULTI): ICD-10-CM

## 2024-06-07 RX ORDER — METOPROLOL SUCCINATE 50 MG/1
TABLET, EXTENDED RELEASE ORAL
Qty: 10 TABLET | Refills: 0 | Status: SHIPPED | OUTPATIENT
Start: 2024-06-07

## 2024-06-07 RX ORDER — SPIRONOLACTONE 25 MG/1
TABLET ORAL
Qty: 6 TABLET | Refills: 0 | Status: SHIPPED | OUTPATIENT
Start: 2024-06-07

## 2024-06-10 ENCOUNTER — HOSPITAL ENCOUNTER (OUTPATIENT)
Dept: CARDIOLOGY | Facility: CLINIC | Age: 69
Discharge: HOME | End: 2024-06-10
Payer: MEDICARE

## 2024-06-10 DIAGNOSIS — I44.2 ATRIOVENTRICULAR BLOCK, COMPLETE (MULTI): ICD-10-CM

## 2024-06-12 ENCOUNTER — APPOINTMENT (OUTPATIENT)
Dept: CARDIOLOGY | Facility: HOSPITAL | Age: 69
End: 2024-06-12
Payer: MEDICARE

## 2024-06-15 PROCEDURE — RXMED WILLOW AMBULATORY MEDICATION CHARGE

## 2024-06-17 ENCOUNTER — HOSPITAL ENCOUNTER (OUTPATIENT)
Dept: CARDIOLOGY | Facility: CLINIC | Age: 69
Discharge: HOME | End: 2024-06-17
Payer: MEDICARE

## 2024-06-17 DIAGNOSIS — I44.30 UNSPECIFIED ATRIOVENTRICULAR BLOCK: ICD-10-CM

## 2024-06-17 DIAGNOSIS — Z95.810 PRESENCE OF AUTOMATIC (IMPLANTABLE) CARDIAC DEFIBRILLATOR: ICD-10-CM

## 2024-06-18 ENCOUNTER — PHARMACY VISIT (OUTPATIENT)
Dept: PHARMACY | Facility: CLINIC | Age: 69
End: 2024-06-18
Payer: COMMERCIAL

## 2024-06-18 ENCOUNTER — APPOINTMENT (OUTPATIENT)
Dept: CARDIOLOGY | Facility: CLINIC | Age: 69
End: 2024-06-18
Payer: MEDICARE

## 2024-06-18 VITALS
HEART RATE: 69 BPM | BODY MASS INDEX: 42.52 KG/M2 | DIASTOLIC BLOOD PRESSURE: 63 MMHG | WEIGHT: 151.2 LBS | OXYGEN SATURATION: 96 % | SYSTOLIC BLOOD PRESSURE: 111 MMHG

## 2024-06-18 DIAGNOSIS — I44.30 AVB (ATRIOVENTRICULAR BLOCK): ICD-10-CM

## 2024-06-18 DIAGNOSIS — Z95.0 PACEMAKER: Primary | ICD-10-CM

## 2024-06-18 DIAGNOSIS — I48.0 PAROXYSMAL ATRIAL FIBRILLATION (MULTI): ICD-10-CM

## 2024-06-18 PROCEDURE — 3078F DIAST BP <80 MM HG: CPT | Performed by: INTERNAL MEDICINE

## 2024-06-18 PROCEDURE — 99212 OFFICE O/P EST SF 10 MIN: CPT | Performed by: INTERNAL MEDICINE

## 2024-06-18 PROCEDURE — 1125F AMNT PAIN NOTED PAIN PRSNT: CPT | Performed by: INTERNAL MEDICINE

## 2024-06-18 PROCEDURE — 3074F SYST BP LT 130 MM HG: CPT | Performed by: INTERNAL MEDICINE

## 2024-06-18 PROCEDURE — 1111F DSCHRG MED/CURRENT MED MERGE: CPT | Performed by: INTERNAL MEDICINE

## 2024-06-18 PROCEDURE — 1159F MED LIST DOCD IN RCRD: CPT | Performed by: INTERNAL MEDICINE

## 2024-06-18 PROCEDURE — 99024 POSTOP FOLLOW-UP VISIT: CPT | Performed by: INTERNAL MEDICINE

## 2024-06-18 ASSESSMENT — COLUMBIA-SUICIDE SEVERITY RATING SCALE - C-SSRS
2. HAVE YOU ACTUALLY HAD ANY THOUGHTS OF KILLING YOURSELF?: NO
6. HAVE YOU EVER DONE ANYTHING, STARTED TO DO ANYTHING, OR PREPARED TO DO ANYTHING TO END YOUR LIFE?: NO
1. IN THE PAST MONTH, HAVE YOU WISHED YOU WERE DEAD OR WISHED YOU COULD GO TO SLEEP AND NOT WAKE UP?: NO

## 2024-06-18 ASSESSMENT — PATIENT HEALTH QUESTIONNAIRE - PHQ9
SUM OF ALL RESPONSES TO PHQ9 QUESTIONS 1 AND 2: 0
1. LITTLE INTEREST OR PLEASURE IN DOING THINGS: NOT AT ALL
2. FEELING DOWN, DEPRESSED OR HOPELESS: NOT AT ALL

## 2024-06-18 ASSESSMENT — PAIN SCALES - GENERAL: PAINLEVEL: 2

## 2024-06-18 ASSESSMENT — ENCOUNTER SYMPTOMS
DEPRESSION: 0
OCCASIONAL FEELINGS OF UNSTEADINESS: 0
LOSS OF SENSATION IN FEET: 0

## 2024-06-20 ENCOUNTER — HOSPITAL ENCOUNTER (OUTPATIENT)
Dept: CARDIOLOGY | Facility: CLINIC | Age: 69
Discharge: HOME | End: 2024-06-20
Payer: MEDICARE

## 2024-06-20 DIAGNOSIS — I44.30 UNSPECIFIED ATRIOVENTRICULAR BLOCK: ICD-10-CM

## 2024-06-20 DIAGNOSIS — Z95.810 PRESENCE OF AUTOMATIC (IMPLANTABLE) CARDIAC DEFIBRILLATOR: ICD-10-CM

## 2024-06-22 DIAGNOSIS — I48.0 PAROXYSMAL ATRIAL FIBRILLATION (MULTI): ICD-10-CM

## 2024-06-22 DIAGNOSIS — I50.30 HEART FAILURE WITH PRESERVED LEFT VENTRICULAR FUNCTION (HFPEF) (MULTI): Primary | ICD-10-CM

## 2024-06-24 ENCOUNTER — APPOINTMENT (OUTPATIENT)
Dept: CARDIOLOGY | Facility: HOSPITAL | Age: 69
End: 2024-06-24
Payer: MEDICARE

## 2024-06-24 RX ORDER — SPIRONOLACTONE 25 MG/1
TABLET ORAL
Qty: 45 TABLET | Refills: 3 | Status: SHIPPED | OUTPATIENT
Start: 2024-06-24

## 2024-06-26 NOTE — PROGRESS NOTES
Follow up for incision healing. The patient is doing well. The incision showed appropriate healing at this time. No further measure to be taken. Continue follow up with device clinic.

## 2024-07-08 ENCOUNTER — HOSPITAL ENCOUNTER (OUTPATIENT)
Dept: CARDIOLOGY | Facility: CLINIC | Age: 69
Discharge: HOME | End: 2024-07-08
Payer: MEDICARE

## 2024-07-08 DIAGNOSIS — Z95.810 PRESENCE OF AUTOMATIC (IMPLANTABLE) CARDIAC DEFIBRILLATOR: ICD-10-CM

## 2024-07-08 DIAGNOSIS — I44.30 UNSPECIFIED ATRIOVENTRICULAR BLOCK: ICD-10-CM

## 2024-07-11 ENCOUNTER — TELEPHONE (OUTPATIENT)
Dept: CARDIOLOGY | Facility: HOSPITAL | Age: 69
End: 2024-07-11

## 2024-07-11 ENCOUNTER — APPOINTMENT (OUTPATIENT)
Dept: PHARMACY | Facility: HOSPITAL | Age: 69
End: 2024-07-11
Payer: MEDICARE

## 2024-07-11 DIAGNOSIS — I48.0 PAROXYSMAL ATRIAL FIBRILLATION (MULTI): ICD-10-CM

## 2024-07-11 DIAGNOSIS — I48.0 PAROXYSMAL ATRIAL FIBRILLATION (MULTI): Primary | ICD-10-CM

## 2024-07-11 DIAGNOSIS — I50.30 HEART FAILURE WITH PRESERVED LEFT VENTRICULAR FUNCTION (HFPEF) (MULTI): ICD-10-CM

## 2024-07-11 RX ORDER — METOPROLOL SUCCINATE 50 MG/1
TABLET, EXTENDED RELEASE ORAL
Qty: 90 TABLET | Refills: 3 | Status: SHIPPED | OUTPATIENT
Start: 2024-07-11

## 2024-07-11 NOTE — Clinical Note
Angely Mann, I spoke with Ananth today regarding his Xarelto. Overall, patient is doing well on anticoagulation pharmacotherapy with Xarelto. Denies any signs/symptoms of abnormal bruising or bleeding at this time. Will continue current dosing of Xarelto at this time due to steady renal function. Of note, patient's estimated creatinine clearance is subject to vary based on slight weight changes. Patient denies any bruising or signs/symptoms of bleeding at this time. Due to stability of renal function and patient's increased BMI, will defer changing to decreased dose of Xarelto. Will continue to monitor renal function and safety parameters closely. Of note, patient's most recent eGFR is 74 mL/min/1.73m2. Patient to have new CMP drawn prior to next appointment (3 months after last lab was drawn). Will follow up in 1 month to evaluate renal function and tolerability of pharmacotherapy.

## 2024-07-11 NOTE — PROGRESS NOTES
Pharmacist Clinic: Cardiology Management    Ananth Gimenez is a 68 y.o. male was referred to Clinical Pharmacy Team for anticoagulation management.     Referring Provider: Da Mann MD    THIS IS A FOLLOW UP PATIENT APPOINTMENT. AT LAST VISIT ON 6/6/2024 WITH PHARMACIST (Sarah Chaves).    Appointment was completed by the patient who was reached at .    REVIEW OF PAST APPNT (IF APPLICABLE):   At last pharmacy appointment, patient reported doing well on Xarelto with no concerns regarding adverse effects at that time. Renal function was evaluated at last appointment and current dosage of Xarelto 20 mg was continued due to improved labs.   Patient reported some confusion regarding medication changes prior to last appointment. Counseled on new medication (spironolactone), regarding mechanism of action, monitoring, signs and symptoms of potassium disturbance, and prescribed dosage.      Allergies   Allergen Reactions    Atorvastatin Myalgia     Pain in extremities. Tolerate low dose pravastatin       Past Medical History:   Diagnosis Date    Heart disease     Hyperlipidemia     Hypertension     Irregular heart beat        Current Outpatient Medications on File Prior to Visit   Medication Sig Dispense Refill    aspirin 81 mg EC tablet Take 1 tablet (81 mg) by mouth once daily.      cephalexin (Keflex) 500 mg capsule Take 1 capsule twice a day for 1 week 14 capsule 0    fluticasone (Flonase Allergy Relief) 50 mcg/actuation nasal spray Administer 1 spray into each nostril 2 times a day. Shake gently. Before first use, prime pump. After use, clean tip and replace cap. 16 g 12    furosemide (Lasix) 40 mg tablet Take 1 tablet (40 mg) by mouth once daily.      Klor-Con M20 20 mEq ER tablet Take 1 tablet (20 mEq) by mouth once daily at bedtime.      losartan (Cozaar) 50 mg tablet TAKE 1 TABLET BY MOUTH EVERY DAY 30 tablet 3    metoprolol succinate XL (Toprol-XL) 50 mg 24 hr tablet Dispense only 10 tablets  "please. 10 tablet 0    metoprolol tartrate (Lopressor) 25 mg tablet TAKE 1 TABLET BY MOUTH EVERY 12 HOURS AS DIRECTED 180 tablet 3    multivitamin with minerals (multivit-min-iron fum-folic ac) tablet Take 1 tablet by mouth every other day.      pravastatin (Pravachol) 10 mg tablet TAKE 1 TABLET BY MOUTH EVERY DAY 30 tablet 3    rivaroxaban (Xarelto) 20 mg tablet Take 1 tablet (20 mg) by mouth once daily in the evening. Take with meals. 180 tablet 3    sildenafil (Revatio) 20 mg tablet Take 1 tablet (20 mg) by mouth if needed.      spironolactone (Aldactone) 25 mg tablet TAKE 0.5 TABLET BY MOUTH ONCE A DAY. 45 tablet 3     No current facility-administered medications on file prior to visit.         RELEVANT LAB RESULTS  Lab Results   Component Value Date    BILITOT 1.0 07/05/2022    CALCIUM 8.1 (L) 05/25/2024    CO2 25 05/25/2024     05/25/2024    CREATININE 1.09 05/25/2024    GLUCOSE 132 (H) 05/25/2024    ALKPHOS 103 07/05/2022    K 4.0 05/25/2024    PROT 6.8 07/05/2022     (L) 05/25/2024    AST 27 07/05/2022    ALT 34 07/05/2022    BUN 18 05/25/2024    ANIONGAP 11 05/25/2024    MG 2.09 05/25/2024    PHOS 3.4 05/25/2024    ALBUMIN 3.4 05/25/2024    GFRMALE 54 (A) 03/07/2023     Lab Results   Component Value Date    TRIG 62 08/17/2019    CHOL 129 08/17/2019    HDL 45.5 08/17/2019     No results found for: \"BMCBC\", \"CBCDIF\"     PHARMACEUTICAL ASSESSMENT    Medication Documentation Review Audit       Reviewed by Yessica Parisi MA (Medical Assistant) on 06/18/24 at 1359      Medication Order Taking? Sig Documenting Provider Last Dose Status   acetaminophen (Tylenol) 500 mg tablet [id number] Yes Take 2 tablets (1,000 mg) by mouth every 6 hours if needed for mild pain (1 - 3). Remy Roberson MD Taking Active   aspirin 81 mg EC tablet [id number] Yes Take 1 tablet (81 mg) by mouth once daily. Historical Provider, MD Taking Active   cephalexin (Keflex) 500 mg capsule [id number]  Take 1 capsule twice a day for 1 " week Fredis Burks MD  Flag for Review   fluticasone (Flonase Allergy Relief) 50 mcg/actuation nasal spray [id number] Yes Administer 1 spray into each nostril 2 times a day. Shake gently. Before first use, prime pump. After use, clean tip and replace cap. Andrés Elder MD Taking Active   furosemide (Lasix) 40 mg tablet [id number]  Take 1 tablet (40 mg) by mouth once daily. Andrés Elder MD  Active   Klor-Con M20 20 mEq ER tablet [id number]  Take 1 tablet (20 mEq) by mouth once daily at bedtime. Historical Provider, MD  Active   losartan (Cozaar) 50 mg tablet [id number]  TAKE 1 TABLET BY MOUTH EVERY DAY Da Mann MD  Active   metoprolol succinate XL (Toprol-XL) 50 mg 24 hr tablet [id number]  Dispense only 10 tablets please. Fredis Burks MD  Active   metoprolol tartrate (Lopressor) 25 mg tablet [id number]  TAKE 1 TABLET BY MOUTH EVERY 12 HOURS AS DIRECTED Da Mann MD  Active   multivitamin with minerals (multivit-min-iron fum-folic ac) tablet [id number]  Take 1 tablet by mouth every other day. Historical Provider, MD  Active   pravastatin (Pravachol) 10 mg tablet [id number]  TAKE 1 TABLET BY MOUTH EVERY DAY Da Mann MD  Active   rivaroxaban (Xarelto) 20 mg tablet [id number]  Take 1 tablet (20 mg) by mouth once daily in the evening. Take with meals. Da Mann MD  Active   sildenafil (Revatio) 20 mg tablet [id number]  Take 1 tablet (20 mg) by mouth if needed. Historical Provider, MD  Active   spironolactone (Aldactone) 25 mg tablet [id number]  Take 0.5 tablet once a day. Please dispanse 6 tablets Fredis Burks MD  Active                    DISEASE MANAGEMENT ASSESSMENT:     ANTICOAGULATION ASSESSMENT    The ASCVD Risk score (Hema JOSE, et al., 2019) failed to calculate for the following reasons:    Cannot find a previous HDL lab    Cannot find a previous total cholesterol lab    DIAGNOSIS: prevention of nonvalvular atrial fibrilliation stroke and systemic embolism  - Patient is projected to be on  anticoagulation indefinitely  - [id number] Score: [3] (only included if diagnosis is atrial fibrillation)   Age: [<65 (0)] [65-74 (+1)] [> 75 (+2)]: 1  Sex: [Male/Female (+1)]: 0  CHF history: [No/Yes(+1)]: 1  Hypertension history: [No/Yes(+1)]: 1  Stroke/TIA/thromboembolism history: [No/Yes(+2)]: 0  Vascular disease history (prior MI, peripheral artery disease, aortic plaque): [No/Yes(+1)]: 0  Diabetes history: [No/Yes(+1)]: 0    CURRENT PHARMACOTHERAPY:    Xarelto 20 mg daily  Age 68 years  Weight 68.6 kg (6/18/2024)  Height 127 cm  Scr 1.09 mg/dL (5/25/2024)  Estimated CrCl:   64 mL/min, 41 mL/min (using adjusted body weight), 40.6 mL/min (using ideal body weight)  Will continue current dosing of Xarelto at this time due to steady renal function. Of note, patient's estimated creatinine clearance is subject to vary based on slight weight changes. Patient denies any bruising or signs/symptoms of bleeding at this time. Due to stability and patient's increased BMI, will defer changing to decreased dose of Xarelto. Will continue to monitor renal function and safety parameters closely. Of note, patient's most recent eGFR is 74 mL/min/1.73m2. Patient to have new CMP drawn prior to next appointment (3 months after last lab was drawn).    RELEVANT PAST MEDICAL HISTORY:   A-fib, HTN, HLD, HFpEF, ZIGGY    Affordability/Accessibility:  PAP for Xarelto  Adherence/Organization: no issues per patient, uses medication box  Adverse Reactions: patient has a bruise on his hand after hitting his hand, but this is healing appropriately. No change in color of stool or blood in bowl after bowel movement.   Recent Hospitalizations: none since last pharmacy appointment  Recent Falls/Trauma: none  Changes in Tobacco or Alcohol Intake:   Tobacco: none, does not use  Alcohol: Will go see local band once per week and will have 3-4 alcoholic beverages     EDUCATION/COUNSELING:   - Counseled patient on MOA, expectations, duration of therapy,  contraindications, administration, and monitoring parameters  - Counseled patient of side effects that are indicative of bleeding such as dark tarry stool, unexplainable bruising, or vomiting up a coffee ground like substance  - Discussed increased risk of bleeding with alcohol use when taking Xarelto. Advised patient to limit alcohol intake and watch for signs/symptoms of bleeding if drinking alcohol while on anticoagulation.      DISCUSSION/NOTES:   Overall, patient is doing well on anticoagulation pharmacotherapy with Xarelto. Denies any signs/symptoms of abnormal bruising or bleeding at this time. Will continue current dosing of Xarelto at this time due to steady renal function. Of note, patient's estimated creatinine clearance is subject to vary based on slight weight changes. Patient denies any bruising or signs/symptoms of bleeding at this time. Due to stability of renal function and patient's increased BMI, will defer changing to decreased dose of Xarelto. Will continue to monitor renal function and safety parameters closely. Of note, patient's most recent eGFR is 74 mL/min/1.73m2. Patient to have new CMP drawn prior to next appointment (3 months after last lab was drawn). Will follow up in 1 month to evaluate renal function and tolerability of pharmacotherapy.  Patient expressed he has two prescriptions for metoprolol, one for metoprolol tartrate (prescribed by Dr. Mann) and one for metoprolol succinate (prescribed by Dr. Burks). Patient is unsure which he is meant to be taken, as has previously been taking both. Advised patient to call physician's office to clarify. Pharmacist to also reach out to Dr. Mann to clarify which metoprolol he is meant to be taking. Patient will take metoprolol succinate this morning, and will call cardiology office if he has not heard an update prior to the time he typically has been taking his afternoon dose of metoprolol tartrate.  Addendum after appointment 7/11/24: Per   Mann, Okay to just do Metoprolol Succinate 50 every day (but if patient prefers and has lots of the Tartrate, 25mg bid of that is equivalent and okay). Left voicemail for patient with callback information.    ASSESSMENT AND PLAN:    Assessment/Plan   Problem List Items Addressed This Visit       Paroxysmal atrial fibrillation (Multi) - Primary    Relevant Orders    Follow Up In Clinical Pharmacy         RECOMMENDATIONS/PLAN    Continue: Xarelto 20 mg daily  Patient to have updated CMP lab drawn prior to appointment for evaluation of renal function  Follow up: 6 weeks    Next Cardiology Appointment: 6/18/2024  Clinical Pharmacist follow up: 8/22/2024  VAF/Application Expiration: Yes    Date: 4/9/2024  Type of Encounter: Renetta Aguilar PharmD    Verbal consent to manage patient's drug therapy was obtained from the patient . They were informed they may decline to participate or withdraw from participation in pharmacy services at any time.    Continue all meds under the continuation of care with the referring provider and clinical pharmacy team.

## 2024-07-12 ENCOUNTER — LAB (OUTPATIENT)
Dept: LAB | Facility: LAB | Age: 69
End: 2024-07-12
Payer: MEDICARE

## 2024-07-12 ENCOUNTER — HOSPITAL ENCOUNTER (OUTPATIENT)
Dept: CARDIOLOGY | Facility: HOSPITAL | Age: 69
Discharge: HOME | End: 2024-07-12
Payer: MEDICARE

## 2024-07-12 DIAGNOSIS — I48.0 PAROXYSMAL ATRIAL FIBRILLATION (MULTI): ICD-10-CM

## 2024-07-12 DIAGNOSIS — I44.30 UNSPECIFIED ATRIOVENTRICULAR BLOCK: ICD-10-CM

## 2024-07-12 DIAGNOSIS — Z95.0 PRESENCE OF CARDIAC PACEMAKER: ICD-10-CM

## 2024-07-12 LAB
ALBUMIN SERPL BCP-MCNC: 4.3 G/DL (ref 3.4–5)
ALP SERPL-CCNC: 91 U/L (ref 33–136)
ALT SERPL W P-5'-P-CCNC: 12 U/L (ref 10–52)
ANION GAP SERPL CALC-SCNC: 12 MMOL/L (ref 10–20)
AST SERPL W P-5'-P-CCNC: 17 U/L (ref 9–39)
BILIRUB SERPL-MCNC: 0.8 MG/DL (ref 0–1.2)
BUN SERPL-MCNC: 28 MG/DL (ref 6–23)
CALCIUM SERPL-MCNC: 9.7 MG/DL (ref 8.6–10.3)
CHLORIDE SERPL-SCNC: 97 MMOL/L (ref 98–107)
CO2 SERPL-SCNC: 29 MMOL/L (ref 21–32)
CREAT SERPL-MCNC: 1.32 MG/DL (ref 0.5–1.3)
EGFRCR SERPLBLD CKD-EPI 2021: 59 ML/MIN/1.73M*2
GLUCOSE SERPL-MCNC: 60 MG/DL (ref 74–99)
POTASSIUM SERPL-SCNC: 4.5 MMOL/L (ref 3.5–5.3)
PROT SERPL-MCNC: 7.2 G/DL (ref 6.4–8.2)
SODIUM SERPL-SCNC: 133 MMOL/L (ref 136–145)

## 2024-07-12 PROCEDURE — 36415 COLL VENOUS BLD VENIPUNCTURE: CPT

## 2024-07-12 PROCEDURE — 93284 PRGRMG EVAL IMPLANTABLE DFB: CPT

## 2024-07-12 PROCEDURE — 80053 COMPREHEN METABOLIC PANEL: CPT

## 2024-07-15 ENCOUNTER — HOSPITAL ENCOUNTER (OUTPATIENT)
Dept: CARDIOLOGY | Facility: CLINIC | Age: 69
Discharge: HOME | End: 2024-07-15
Payer: MEDICARE

## 2024-07-15 DIAGNOSIS — I44.30 UNSPECIFIED ATRIOVENTRICULAR BLOCK: ICD-10-CM

## 2024-07-15 DIAGNOSIS — Z95.0 PRESENCE OF CARDIAC PACEMAKER: ICD-10-CM

## 2024-07-22 ENCOUNTER — HOSPITAL ENCOUNTER (OUTPATIENT)
Dept: CARDIOLOGY | Facility: CLINIC | Age: 69
Discharge: HOME | End: 2024-07-22
Payer: MEDICARE

## 2024-07-22 DIAGNOSIS — I44.30 UNSPECIFIED ATRIOVENTRICULAR BLOCK: ICD-10-CM

## 2024-07-22 DIAGNOSIS — Z95.0 PRESENCE OF CARDIAC PACEMAKER: ICD-10-CM

## 2024-07-29 ENCOUNTER — HOSPITAL ENCOUNTER (OUTPATIENT)
Dept: CARDIOLOGY | Facility: CLINIC | Age: 69
Discharge: HOME | End: 2024-07-29
Payer: MEDICARE

## 2024-07-29 DIAGNOSIS — Z95.0 PRESENCE OF CARDIAC PACEMAKER: ICD-10-CM

## 2024-07-29 DIAGNOSIS — I44.30 UNSPECIFIED ATRIOVENTRICULAR BLOCK: ICD-10-CM

## 2024-08-05 ENCOUNTER — HOSPITAL ENCOUNTER (OUTPATIENT)
Dept: CARDIOLOGY | Facility: CLINIC | Age: 69
Discharge: HOME | End: 2024-08-05
Payer: MEDICARE

## 2024-08-05 DIAGNOSIS — Z95.0 PRESENCE OF CARDIAC PACEMAKER: ICD-10-CM

## 2024-08-05 DIAGNOSIS — I44.30 UNSPECIFIED ATRIOVENTRICULAR BLOCK: ICD-10-CM

## 2024-08-12 ENCOUNTER — HOSPITAL ENCOUNTER (OUTPATIENT)
Dept: CARDIOLOGY | Facility: CLINIC | Age: 69
Discharge: HOME | End: 2024-08-12
Payer: MEDICARE

## 2024-08-12 DIAGNOSIS — I44.30 UNSPECIFIED ATRIOVENTRICULAR BLOCK: ICD-10-CM

## 2024-08-12 DIAGNOSIS — Z95.0 PRESENCE OF CARDIAC PACEMAKER: ICD-10-CM

## 2024-08-19 ENCOUNTER — HOSPITAL ENCOUNTER (OUTPATIENT)
Dept: CARDIOLOGY | Facility: CLINIC | Age: 69
Discharge: HOME | End: 2024-08-19
Payer: MEDICARE

## 2024-08-19 DIAGNOSIS — Z95.0 PRESENCE OF CARDIAC PACEMAKER: ICD-10-CM

## 2024-08-19 DIAGNOSIS — I44.30 UNSPECIFIED ATRIOVENTRICULAR BLOCK: ICD-10-CM

## 2024-08-22 ENCOUNTER — APPOINTMENT (OUTPATIENT)
Dept: PHARMACY | Facility: HOSPITAL | Age: 69
End: 2024-08-22
Payer: MEDICARE

## 2024-08-22 DIAGNOSIS — I48.0 PAROXYSMAL ATRIAL FIBRILLATION (MULTI): ICD-10-CM

## 2024-08-22 DIAGNOSIS — I50.30 HEART FAILURE WITH PRESERVED LEFT VENTRICULAR FUNCTION (HFPEF) (MULTI): ICD-10-CM

## 2024-08-22 NOTE — PROGRESS NOTES
Pharmacist Clinic: Cardiology Management    Ananth Gimenez is a 69 y.o. male was referred to Clinical Pharmacy Team for Anticoagulation management.     Referring Provider: Da Mann MD    THIS IS A FOLLOW UP PATIENT APPOINTMENT. AT LAST VISIT ON 7/11/24 WITH PHARMACIST (Vicki Aguilar).    Appointment was completed by Ananth who was reached at primary number.    REVIEW OF PAST APPNT (IF APPLICABLE):   Ananth is receiving Xarelto thorough  PAP with a renewal date of 4/9/25.  During last appointment they were instructed to get lab work done to assess for any dose adjustment that would be needed for Xarelto.      Allergies   Allergen Reactions    Atorvastatin Myalgia     Pain in extremities. Tolerate low dose pravastatin       Past Medical History:   Diagnosis Date    Heart disease     Hyperlipidemia     Hypertension     Irregular heart beat        Current Outpatient Medications on File Prior to Visit   Medication Sig Dispense Refill    aspirin 81 mg EC tablet Take 1 tablet (81 mg) by mouth once daily.      cephalexin (Keflex) 500 mg capsule Take 1 capsule twice a day for 1 week 14 capsule 0    fluticasone (Flonase Allergy Relief) 50 mcg/actuation nasal spray Administer 1 spray into each nostril 2 times a day. Shake gently. Before first use, prime pump. After use, clean tip and replace cap. 16 g 12    furosemide (Lasix) 40 mg tablet Take 1 tablet (40 mg) by mouth once daily.      Klor-Con M20 20 mEq ER tablet Take 1 tablet (20 mEq) by mouth once daily at bedtime.      losartan (Cozaar) 50 mg tablet TAKE 1 TABLET BY MOUTH EVERY DAY 30 tablet 3    metoprolol succinate XL (Toprol-XL) 50 mg 24 hr tablet Dispense only 10 tablets please. 90 tablet 3    multivitamin with minerals (multivit-min-iron fum-folic ac) tablet Take 1 tablet by mouth every other day.      pravastatin (Pravachol) 10 mg tablet TAKE 1 TABLET BY MOUTH EVERY DAY 30 tablet 3    rivaroxaban (Xarelto) 20 mg tablet Take 1 tablet (20 mg) by mouth once  "daily in the evening. Take with meals. 180 tablet 3    sildenafil (Revatio) 20 mg tablet Take 1 tablet (20 mg) by mouth if needed.      spironolactone (Aldactone) 25 mg tablet TAKE 0.5 TABLET BY MOUTH ONCE A DAY. 45 tablet 3     No current facility-administered medications on file prior to visit.         RELEVANT LAB RESULTS  Lab Results   Component Value Date    BILITOT 0.8 07/12/2024    CALCIUM 9.7 07/12/2024    CO2 29 07/12/2024    CL 97 (L) 07/12/2024    CREATININE 1.32 (H) 07/12/2024    GLUCOSE 60 (L) 07/12/2024    ALKPHOS 91 07/12/2024    K 4.5 07/12/2024    PROT 7.2 07/12/2024     (L) 07/12/2024    AST 17 07/12/2024    ALT 12 07/12/2024    BUN 28 (H) 07/12/2024    ANIONGAP 12 07/12/2024    MG 2.09 05/25/2024    PHOS 3.4 05/25/2024    ALBUMIN 4.3 07/12/2024    GFRMALE 54 (A) 03/07/2023     Lab Results   Component Value Date    TRIG 62 08/17/2019    CHOL 129 08/17/2019    HDL 45.5 08/17/2019     No results found for: \"BMCBC\", \"CBCDIF\"     PHARMACEUTICAL ASSESSMENT    Medication Documentation Review Audit       Reviewed by Yessica Parisi MA (Medical Assistant) on 06/18/24 at 1359      Medication Order Taking? Sig Documenting Provider Last Dose Status   acetaminophen (Tylenol) 500 mg tablet [id number] Yes Take 2 tablets (1,000 mg) by mouth every 6 hours if needed for mild pain (1 - 3). Remy Roberson MD Taking Active   aspirin 81 mg EC tablet [id number] Yes Take 1 tablet (81 mg) by mouth once daily. Historical Provider, MD Taking Active   cephalexin (Keflex) 500 mg capsule [id number]  Take 1 capsule twice a day for 1 week Fredis Burks MD  Flag for Review   fluticasone (Flonase Allergy Relief) 50 mcg/actuation nasal spray [id number] Yes Administer 1 spray into each nostril 2 times a day. Shake gently. Before first use, prime pump. After use, clean tip and replace cap. Andrés Elder MD Taking Active   furosemide (Lasix) 40 mg tablet [id number]  Take 1 tablet (40 mg) by mouth once daily. Andrés Elder MD  " Active   Klor-Con M20 20 mEq ER tablet [id number]  Take 1 tablet (20 mEq) by mouth once daily at bedtime. Historical Provider, MD  Active   losartan (Cozaar) 50 mg tablet [id number]  TAKE 1 TABLET BY MOUTH EVERY DAY Da Mann MD  Active   metoprolol succinate XL (Toprol-XL) 50 mg 24 hr tablet [id number]  Dispense only 10 tablets please. Fredis Burks MD  Active   metoprolol tartrate (Lopressor) 25 mg tablet [id number]  TAKE 1 TABLET BY MOUTH EVERY 12 HOURS AS DIRECTED Da Mann MD  Active   multivitamin with minerals (multivit-min-iron fum-folic ac) tablet [id number]  Take 1 tablet by mouth every other day. Historical Provider, MD  Active   pravastatin (Pravachol) 10 mg tablet [id number]  TAKE 1 TABLET BY MOUTH EVERY DAY Da Mann MD  Active   rivaroxaban (Xarelto) 20 mg tablet [id number]  Take 1 tablet (20 mg) by mouth once daily in the evening. Take with meals. Da Mann MD  Active   sildenafil (Revatio) 20 mg tablet [id number]  Take 1 tablet (20 mg) by mouth if needed. Historical Provider, MD  Active   spironolactone (Aldactone) 25 mg tablet [id number]  Take 0.5 tablet once a day. Please dispanse 6 tablets Fredis Burks MD  Active                    DISEASE MANAGEMENT ASSESSMENT:     ANTICOAGULATION ASSESSMENT    The ASCVD Risk score (Hema JOSE, et al., 2019) failed to calculate for the following reasons:    Cannot find a previous HDL lab    Cannot find a previous total cholesterol lab    DIAGNOSIS: prevention of nonvalvular atrial fibrilliation stroke and systemic embolism  - Patient is projected to be on anticoagulation indefinitely  - [id number] Score: [3] (only included if diagnosis is atrial fibrillation)   Age: [<65 (0)] [65-74 (+1)] [> 75 (+2)]: 1  Sex: [Male/Female (+1)]: 0  CHF history: [No/Yes(+1)]: 1  Hypertension history: [No/Yes(+1)]: 1  Stroke/TIA/thromboembolism history: [No/Yes(+2)]: 0  Vascular disease history (prior MI, peripheral artery disease, aortic plaque): [No/Yes(+1)]:  0  Diabetes history: [No/Yes(+1)]: 0    CURRENT PHARMACOTHERAPY:    Xarelto 20mg daily which is appropriate for a patient whose CrCl is 52ml/min.    Affordability/Accessibility: [facility]  Adherence/Organization: reports adherence  Adverse Reactions: none reported  Recent Hospitalizations: none reported  Recent Falls/Trauma: none reported  Changes in Tobacco or Alcohol Intake:   Tobacco: does not use  Alcohol: has a drink 1 day a week    EDUCATION/COUNSELING:   - Counseled patient on MOA, expectations, duration of therapy, contraindications, administration, and monitoring parameters  - Counseled patient of side effects that are indicative of bleeding such as dark tarry stool, unexplainable bruising, or vomiting up a coffee ground like substance      DISCUSSION/NOTES:   Reports he is doing well with Xarelto. All bruising and bleeding that has occurred heals on its own without concern. Reports he has not needed medical evaluation for any bleeds in the past.    ASSESSMENT AND PLAN:    Assessment/Plan   Problem List Items Addressed This Visit       Paroxysmal atrial fibrillation (Multi)     No dose adjustment needed for Xarelto at today's appointment.  Continue to monitor CrCl for future dose adjustments. Patient's CrCl currently >50ml/min              RECOMMENDATIONS/PLAN    CONTINUE  Xarelto 20mg daily    Next Cardiology Appointment: needs scheduling  Clinical Pharmacist follow up: 1/22/25  VAF/Application Expiration: Yes    Date: 4/9/25  Type of Encounter: Renetta DuganD  PGY1 Resident     Verbal consent to manage patient's drug therapy was obtained from the patient . They were informed they may decline to participate or withdraw from participation in pharmacy services at any time.    Continue all meds under the continuation of care with the referring provider and clinical pharmacy team.

## 2024-08-22 NOTE — ASSESSMENT & PLAN NOTE
No dose adjustment needed for Xarelto at today's appointment.  Continue to monitor CrCl for future dose adjustments. Patient's CrCl currently >50ml/min

## 2024-08-22 NOTE — Clinical Note
Hi Dr. Mann,  Reports he is doing well with Xarelto. All bruising and bleeding that has occurred heals on its own without concern. Reports he has not needed medical evaluation for any bleeds in the past.  Also, it doesn't look like this patient has any follow up appnts scheduled with you. Due to our contract, the patient must see the referring provider at least once a year to continue to be part of our services. Can someone from your office get him set up with an appnt?

## 2024-08-23 RX ORDER — SPIRONOLACTONE 25 MG/1
12.5 TABLET ORAL DAILY
Qty: 45 TABLET | Refills: 3 | Status: SHIPPED | OUTPATIENT
Start: 2024-08-23 | End: 2025-08-23

## 2024-08-26 ENCOUNTER — HOSPITAL ENCOUNTER (OUTPATIENT)
Dept: CARDIOLOGY | Facility: CLINIC | Age: 69
Discharge: HOME | End: 2024-08-26
Payer: MEDICARE

## 2024-08-26 DIAGNOSIS — I44.2 ATRIOVENTRICULAR BLOCK, COMPLETE (MULTI): ICD-10-CM

## 2024-08-26 PROCEDURE — 93295 DEV INTERROG REMOTE 1/2/MLT: CPT | Performed by: INTERNAL MEDICINE

## 2024-08-26 PROCEDURE — 93296 REM INTERROG EVL PM/IDS: CPT

## 2024-09-03 ENCOUNTER — HOSPITAL ENCOUNTER (OUTPATIENT)
Dept: CARDIOLOGY | Facility: CLINIC | Age: 69
Discharge: HOME | End: 2024-09-03
Payer: MEDICARE

## 2024-09-03 DIAGNOSIS — I44.2 ATRIOVENTRICULAR BLOCK, COMPLETE (MULTI): ICD-10-CM

## 2024-09-09 ENCOUNTER — HOSPITAL ENCOUNTER (OUTPATIENT)
Dept: CARDIOLOGY | Facility: CLINIC | Age: 69
Discharge: HOME | End: 2024-09-09
Payer: MEDICARE

## 2024-09-09 DIAGNOSIS — I44.30 UNSPECIFIED ATRIOVENTRICULAR BLOCK: ICD-10-CM

## 2024-09-09 DIAGNOSIS — Z95.0 PRESENCE OF CARDIAC PACEMAKER: ICD-10-CM

## 2024-09-09 PROCEDURE — RXMED WILLOW AMBULATORY MEDICATION CHARGE

## 2024-09-10 ENCOUNTER — PHARMACY VISIT (OUTPATIENT)
Dept: PHARMACY | Facility: CLINIC | Age: 69
End: 2024-09-10
Payer: COMMERCIAL

## 2024-09-16 ENCOUNTER — HOSPITAL ENCOUNTER (OUTPATIENT)
Dept: CARDIOLOGY | Facility: CLINIC | Age: 69
Discharge: HOME | End: 2024-09-16
Payer: MEDICARE

## 2024-09-16 DIAGNOSIS — Z95.0 PRESENCE OF CARDIAC PACEMAKER: ICD-10-CM

## 2024-09-16 DIAGNOSIS — I44.30 UNSPECIFIED ATRIOVENTRICULAR BLOCK: ICD-10-CM

## 2024-09-23 ENCOUNTER — HOSPITAL ENCOUNTER (OUTPATIENT)
Dept: CARDIOLOGY | Facility: CLINIC | Age: 69
Discharge: HOME | End: 2024-09-23
Payer: MEDICARE

## 2024-09-23 DIAGNOSIS — I44.30 UNSPECIFIED ATRIOVENTRICULAR BLOCK: ICD-10-CM

## 2024-09-23 DIAGNOSIS — Z95.0 PRESENCE OF CARDIAC PACEMAKER: ICD-10-CM

## 2024-09-30 ENCOUNTER — APPOINTMENT (OUTPATIENT)
Dept: CARDIOLOGY | Facility: CLINIC | Age: 69
End: 2024-09-30
Payer: MEDICARE

## 2024-09-30 ENCOUNTER — HOSPITAL ENCOUNTER (OUTPATIENT)
Dept: CARDIOLOGY | Facility: CLINIC | Age: 69
Discharge: HOME | End: 2024-09-30
Payer: MEDICARE

## 2024-09-30 DIAGNOSIS — Z95.810 PRESENCE OF AUTOMATIC CARDIOVERTER/DEFIBRILLATOR (AICD): ICD-10-CM

## 2024-09-30 DIAGNOSIS — I44.30 INCOMPLETE HEART BLOCK: ICD-10-CM

## 2024-10-07 ENCOUNTER — HOSPITAL ENCOUNTER (OUTPATIENT)
Dept: CARDIOLOGY | Facility: CLINIC | Age: 69
Discharge: HOME | End: 2024-10-07
Payer: MEDICARE

## 2024-10-07 DIAGNOSIS — Z95.810 PRESENCE OF AUTOMATIC CARDIOVERTER/DEFIBRILLATOR (AICD): ICD-10-CM

## 2024-10-07 DIAGNOSIS — I44.30 INCOMPLETE HEART BLOCK: ICD-10-CM

## 2024-10-14 ENCOUNTER — HOSPITAL ENCOUNTER (OUTPATIENT)
Dept: CARDIOLOGY | Facility: CLINIC | Age: 69
Discharge: HOME | End: 2024-10-14
Payer: MEDICARE

## 2024-10-14 DIAGNOSIS — I44.30 INCOMPLETE HEART BLOCK: ICD-10-CM

## 2024-10-14 DIAGNOSIS — Z95.810 PRESENCE OF AUTOMATIC CARDIOVERTER/DEFIBRILLATOR (AICD): ICD-10-CM

## 2024-10-21 ENCOUNTER — HOSPITAL ENCOUNTER (OUTPATIENT)
Dept: CARDIOLOGY | Facility: CLINIC | Age: 69
Discharge: HOME | End: 2024-10-21
Payer: MEDICARE

## 2024-10-21 DIAGNOSIS — I44.30 INCOMPLETE HEART BLOCK: ICD-10-CM

## 2024-10-21 DIAGNOSIS — Z95.810 PRESENCE OF AUTOMATIC CARDIOVERTER/DEFIBRILLATOR (AICD): ICD-10-CM

## 2024-10-28 ENCOUNTER — HOSPITAL ENCOUNTER (OUTPATIENT)
Dept: CARDIOLOGY | Facility: CLINIC | Age: 69
Discharge: HOME | End: 2024-10-28
Payer: MEDICARE

## 2024-10-28 DIAGNOSIS — Z95.810 PRESENCE OF AUTOMATIC CARDIOVERTER/DEFIBRILLATOR (AICD): ICD-10-CM

## 2024-10-28 DIAGNOSIS — I44.30 INCOMPLETE HEART BLOCK: ICD-10-CM

## 2024-11-05 ENCOUNTER — HOSPITAL ENCOUNTER (OUTPATIENT)
Dept: CARDIOLOGY | Facility: CLINIC | Age: 69
Discharge: HOME | End: 2024-11-05
Payer: MEDICARE

## 2024-11-05 DIAGNOSIS — I44.2 ATRIOVENTRICULAR BLOCK, COMPLETE (MULTI): ICD-10-CM

## 2024-11-11 ENCOUNTER — HOSPITAL ENCOUNTER (OUTPATIENT)
Dept: CARDIOLOGY | Facility: CLINIC | Age: 69
Discharge: HOME | End: 2024-11-11
Payer: MEDICARE

## 2024-11-11 DIAGNOSIS — I44.2 ATRIOVENTRICULAR BLOCK, COMPLETE (MULTI): ICD-10-CM

## 2024-11-19 ENCOUNTER — HOSPITAL ENCOUNTER (OUTPATIENT)
Dept: CARDIOLOGY | Facility: CLINIC | Age: 69
Discharge: HOME | End: 2024-11-19
Payer: MEDICARE

## 2024-11-19 DIAGNOSIS — I44.2 ATRIOVENTRICULAR BLOCK, COMPLETE (MULTI): ICD-10-CM

## 2024-11-19 PROCEDURE — 93296 REM INTERROG EVL PM/IDS: CPT

## 2024-11-25 ENCOUNTER — HOSPITAL ENCOUNTER (OUTPATIENT)
Dept: CARDIOLOGY | Facility: CLINIC | Age: 69
Discharge: HOME | End: 2024-11-25
Payer: MEDICARE

## 2024-11-25 DIAGNOSIS — I44.2 ATRIOVENTRICULAR BLOCK, COMPLETE (MULTI): ICD-10-CM

## 2024-12-02 ENCOUNTER — HOSPITAL ENCOUNTER (OUTPATIENT)
Dept: CARDIOLOGY | Facility: CLINIC | Age: 69
Discharge: HOME | End: 2024-12-02
Payer: MEDICARE

## 2024-12-02 DIAGNOSIS — I44.2 ATRIOVENTRICULAR BLOCK, COMPLETE (MULTI): ICD-10-CM

## 2024-12-08 PROCEDURE — RXMED WILLOW AMBULATORY MEDICATION CHARGE

## 2024-12-09 ENCOUNTER — HOSPITAL ENCOUNTER (OUTPATIENT)
Dept: CARDIOLOGY | Facility: CLINIC | Age: 69
Discharge: HOME | End: 2024-12-09
Payer: MEDICARE

## 2024-12-09 DIAGNOSIS — I44.30 UNSPECIFIED ATRIOVENTRICULAR BLOCK: ICD-10-CM

## 2024-12-09 DIAGNOSIS — Z95.810 PRESENCE OF AUTOMATIC (IMPLANTABLE) CARDIAC DEFIBRILLATOR: ICD-10-CM

## 2024-12-12 ENCOUNTER — PHARMACY VISIT (OUTPATIENT)
Dept: PHARMACY | Facility: CLINIC | Age: 69
End: 2024-12-12
Payer: COMMERCIAL

## 2024-12-13 ENCOUNTER — HOSPITAL ENCOUNTER (OUTPATIENT)
Dept: CARDIOLOGY | Facility: HOSPITAL | Age: 69
Discharge: HOME | End: 2024-12-13
Payer: MEDICARE

## 2024-12-13 DIAGNOSIS — I44.2 ATRIOVENTRICULAR BLOCK, COMPLETE (MULTI): ICD-10-CM

## 2024-12-13 PROCEDURE — 93284 PRGRMG EVAL IMPLANTABLE DFB: CPT

## 2024-12-16 ENCOUNTER — HOSPITAL ENCOUNTER (OUTPATIENT)
Dept: CARDIOLOGY | Facility: CLINIC | Age: 69
Discharge: HOME | End: 2024-12-16
Payer: MEDICARE

## 2024-12-16 DIAGNOSIS — I44.30 UNSPECIFIED ATRIOVENTRICULAR BLOCK: ICD-10-CM

## 2024-12-16 DIAGNOSIS — Z95.810 PRESENCE OF AUTOMATIC (IMPLANTABLE) CARDIAC DEFIBRILLATOR: ICD-10-CM

## 2024-12-23 ENCOUNTER — HOSPITAL ENCOUNTER (OUTPATIENT)
Dept: CARDIOLOGY | Facility: CLINIC | Age: 69
Discharge: HOME | End: 2024-12-23
Payer: MEDICARE

## 2024-12-23 DIAGNOSIS — Z95.810 PRESENCE OF AUTOMATIC (IMPLANTABLE) CARDIAC DEFIBRILLATOR: ICD-10-CM

## 2024-12-23 DIAGNOSIS — I44.30 UNSPECIFIED ATRIOVENTRICULAR BLOCK: ICD-10-CM

## 2024-12-30 ENCOUNTER — HOSPITAL ENCOUNTER (OUTPATIENT)
Dept: CARDIOLOGY | Facility: CLINIC | Age: 69
Discharge: HOME | End: 2024-12-30
Payer: MEDICARE

## 2024-12-30 DIAGNOSIS — Z95.810 PRESENCE OF AUTOMATIC (IMPLANTABLE) CARDIAC DEFIBRILLATOR: ICD-10-CM

## 2024-12-30 DIAGNOSIS — I44.30 UNSPECIFIED ATRIOVENTRICULAR BLOCK: ICD-10-CM

## 2025-01-06 ENCOUNTER — HOSPITAL ENCOUNTER (OUTPATIENT)
Dept: CARDIOLOGY | Facility: CLINIC | Age: 70
Discharge: HOME | End: 2025-01-06
Payer: MEDICARE

## 2025-01-06 DIAGNOSIS — Z95.810 PRESENCE OF AUTOMATIC (IMPLANTABLE) CARDIAC DEFIBRILLATOR: ICD-10-CM

## 2025-01-06 DIAGNOSIS — I44.30 UNSPECIFIED ATRIOVENTRICULAR BLOCK: ICD-10-CM

## 2025-01-13 ENCOUNTER — HOSPITAL ENCOUNTER (OUTPATIENT)
Dept: CARDIOLOGY | Facility: CLINIC | Age: 70
Discharge: HOME | End: 2025-01-13
Payer: MEDICARE

## 2025-01-13 DIAGNOSIS — I44.30 UNSPECIFIED ATRIOVENTRICULAR BLOCK: ICD-10-CM

## 2025-01-13 DIAGNOSIS — Z95.810 PRESENCE OF AUTOMATIC (IMPLANTABLE) CARDIAC DEFIBRILLATOR: ICD-10-CM

## 2025-01-22 ENCOUNTER — APPOINTMENT (OUTPATIENT)
Dept: PHARMACY | Facility: HOSPITAL | Age: 70
End: 2025-01-22
Payer: MEDICARE

## 2025-01-22 DIAGNOSIS — I48.0 PAROXYSMAL ATRIAL FIBRILLATION (MULTI): Primary | ICD-10-CM

## 2025-01-22 RX ORDER — MIRABEGRON 50 MG/1
50 TABLET, FILM COATED, EXTENDED RELEASE ORAL DAILY PRN
COMMUNITY

## 2025-01-22 NOTE — Clinical Note
No abnormal bruising or bleeding on Xarelto. Reports adherence at today's visit. No dose adjustments needed

## 2025-01-22 NOTE — PROGRESS NOTES
I reviewed the progress note and agree with the resident's findings and plans as written. Case discussed with resident.    Rene Saunders  [phone]

## 2025-01-22 NOTE — PROGRESS NOTES
Pharmacist Clinic: Cardiology Management    Ananth Gimenez is a 69 y.o. male was referred to Clinical Pharmacy Team for anticoagulation management.     Referring Provider: Fredis Burks MD    THIS IS A FOLLOW UP PATIENT APPOINTMENT. AT LAST VISIT ON 8/22/2024 WITH PHARMACIST (Rene Saunders).    Appointment was completed by Ananth who was reached at [phone].    REVIEW OF PAST APPNT (IF APPLICABLE):   Patient is receiving Xarelto through [facility] with a renewal date of 4/9/2025.   During last appointment, patient reported tolerating medication well and no signs/symptoms of bleeding.     Allergies Reviewed? No    Allergies   Allergen Reactions    Atorvastatin Myalgia     Pain in extremities. Tolerate low dose pravastatin       Past Medical History:   Diagnosis Date    Heart disease     Hyperlipidemia     Hypertension     Irregular heart beat        Current Outpatient Medications on File Prior to Visit   Medication Sig Dispense Refill    aspirin 81 mg EC tablet Take 1 tablet (81 mg) by mouth once daily.      fluticasone (Flonase Allergy Relief) 50 mcg/actuation nasal spray Administer 1 spray into each nostril 2 times a day. Shake gently. Before first use, prime pump. After use, clean tip and replace cap. (Patient taking differently: Administer 1 spray into each nostril 2 times a day as needed for allergies. Shake gently. Before first use, prime pump. After use, clean tip and replace cap.) 16 g 12    furosemide (Lasix) 40 mg tablet Take 1 tablet (40 mg) by mouth once daily.      Klor-Con M20 20 mEq ER tablet TAKE 1 TABLET BY MOUTH EVERY DAY 90 tablet 3    losartan (Cozaar) 50 mg tablet TAKE 1 TABLET BY MOUTH EVERY DAY 30 tablet 3    metoprolol succinate XL (Toprol-XL) 50 mg 24 hr tablet Dispense only 10 tablets please. 90 tablet 3    mirabegron (Myrbetriq) 50 mg tablet extended release 24 hr 24 hr tablet Take 1 tablet (50 mg) by mouth once daily as needed (urinary frequency).      pravastatin (Pravachol) 10  "mg tablet TAKE 1 TABLET BY MOUTH EVERY DAY 30 tablet 3    rivaroxaban (Xarelto) 20 mg tablet Take 1 tablet (20 mg) by mouth once daily in the evening. Take with meals. 180 tablet 3    spironolactone (Aldactone) 25 mg tablet Take 0.5 tablets (12.5 mg) by mouth once daily. TAKE 0.5 TABLET BY MOUTH ONCE A DAY. 45 tablet 3    cephalexin (Keflex) 500 mg capsule Take 1 capsule twice a day for 1 week (Patient not taking: Reported on 1/22/2025) 14 capsule 0    multivitamin with minerals (multivit-min-iron fum-folic ac) tablet Take 1 tablet by mouth every other day.      sildenafil (Revatio) 20 mg tablet Take 1 tablet (20 mg) by mouth if needed.       No current facility-administered medications on file prior to visit.         RELEVANT LAB RESULTS:  Lab Results   Component Value Date    BILITOT 0.8 07/12/2024    CALCIUM 9.7 07/12/2024    CO2 29 07/12/2024    CL 97 (L) 07/12/2024    CREATININE 1.32 (H) 07/12/2024    GLUCOSE 60 (L) 07/12/2024    ALKPHOS 91 07/12/2024    K 4.5 07/12/2024    PROT 7.2 07/12/2024     (L) 07/12/2024    AST 17 07/12/2024    ALT 12 07/12/2024    BUN 28 (H) 07/12/2024    ANIONGAP 12 07/12/2024    MG 2.09 05/25/2024    PHOS 3.4 05/25/2024    ALBUMIN 4.3 07/12/2024    GFRMALE 54 (A) 03/07/2023     Lab Results   Component Value Date    TRIG 62 08/17/2019    CHOL 129 08/17/2019    HDL 45.5 08/17/2019     No results found for: \"BMCBC\", \"CBCDIF\"     PHARMACEUTICAL ASSESSMENT:    MEDICATION RECONCILIATION    Home Pharmacy Reviewed? Yes, describe: [facility] - [place], [place]     Added:  - none  Changed:  - Fluticasone 50 mcg nasal spray - Instill 1 spray in each nostril 2 times daily as needed for allergies  Removed:  - Cephalexin 500 mg    Drug Interactions? No    Medication Documentation Review Audit       Reviewed by Yessica Parisi MA (Medical Assistant) on 06/18/24 at 1352      Medication Order Taking? Sig Documenting Provider Last Dose Status   acetaminophen (Tylenol) 500 mg tablet [id number] Yes " Take 2 tablets (1,000 mg) by mouth every 6 hours if needed for mild pain (1 - 3). Remy Roberson MD Taking Active   aspirin 81 mg EC tablet [id number] Yes Take 1 tablet (81 mg) by mouth once daily. Historical Provider, MD Taking Active   cephalexin (Keflex) 500 mg capsule [id number]  Take 1 capsule twice a day for 1 week Fredis Burks MD  Flag for Review   fluticasone (Flonase Allergy Relief) 50 mcg/actuation nasal spray [id number] Yes Administer 1 spray into each nostril 2 times a day. Shake gently. Before first use, prime pump. After use, clean tip and replace cap. Andrés Elder MD Taking Active   furosemide (Lasix) 40 mg tablet [id number]  Take 1 tablet (40 mg) by mouth once daily. Andrés Elder MD  Active   Klor-Con M20 20 mEq ER tablet [id number]  Take 1 tablet (20 mEq) by mouth once daily at bedtime. Historical Provider, MD  Active   losartan (Cozaar) 50 mg tablet [id number]  TAKE 1 TABLET BY MOUTH EVERY DAY Da Mann MD  Active   metoprolol succinate XL (Toprol-XL) 50 mg 24 hr tablet [id number]  Dispense only 10 tablets please. Fredis Burks MD  Active   metoprolol tartrate (Lopressor) 25 mg tablet [id number]  TAKE 1 TABLET BY MOUTH EVERY 12 HOURS AS DIRECTED Da Mann MD  Active   multivitamin with minerals (multivit-min-iron fum-folic ac) tablet [id number]  Take 1 tablet by mouth every other day. Historical Provider, MD  Active   pravastatin (Pravachol) 10 mg tablet [id number]  TAKE 1 TABLET BY MOUTH EVERY DAY Da Mann MD  Active   rivaroxaban (Xarelto) 20 mg tablet [id number]  Take 1 tablet (20 mg) by mouth once daily in the evening. Take with meals. Da Mann MD  Active   sildenafil (Revatio) 20 mg tablet [id number]  Take 1 tablet (20 mg) by mouth if needed. Historical Provider, MD  Active   spironolactone (Aldactone) 25 mg tablet [id number]  Take 0.5 tablet once a day. Please dispanse 6 tablets Fredis Burks MD  Active                    DISEASE MANAGEMENT ASSESSMENT:     ANTICOAGULATION  ASSESSMENT    The ASCVD Risk score (Hema JOSE, et al., 2019) failed to calculate for the following reasons:    Cannot find a previous HDL lab    Cannot find a previous total cholesterol lab    DIAGNOSIS: prevention of nonvalvular atrial fibrilliation stroke and systemic embolism  - Patient is projected to be on anticoagulation indefinitely  - [id number] Score: [3] (only included if diagnosis is atrial fibrillation)   Age: [<65 (0)] [65-74 (+1)] [> 75 (+2)]: 1  Sex: [Male/Female (+1)]: 0  CHF history: [No/Yes(+1)]: 1  Hypertension history: [No/Yes(+1)]: 1  Stroke/TIA/thromboembolism history: [No/Yes(+2)]: 0  Vascular disease history (prior MI, peripheral artery disease, aortic plaque): [No/Yes(+1)]: 0  Diabetes history: [No/Yes(+1)]: 0    CURRENT PHARMACOTHERAPY:    Xarelto 20 mg twice daily - dose is appropriate for patient's CrCl (59 mL/min)    RELEVANT PAST MEDICAL HISTORY:   A-fib, HTN, HLD, HFpEF, ZIGGY    Affordability/Accessibility:  PAP  Adherence/Organization: reports adherence and uses a pill box   Adverse Reactions: none  Recent Hospitalizations: none  Recent Falls/Trauma: none  Changes in Tobacco or Alcohol Intake:   Tobacco: none  Alcohol: 1-2 cocktails per week     EDUCATION/COUNSELING:   - Counseled patient on MOA, expectations, duration of therapy, contraindications, administration, and monitoring parameters  - Counseled patient of side effects that are indicative of bleeding such as dark tarry stool, unexplainable bruising, or vomiting up a coffee ground like substance        DISCUSSION/NOTES:   Patient continues to tolerate Xarelto well and reports no signs/symptoms of bleeding. Patient stated that he notices he bruises easily but they heal on it's own.   Discussed with patient to monitor for bleeding as alcohol use will also increase risk of bleeding.   Patient stated that he is having difficulties cutting the Spironolactone 25 mg tablet. Also, patient reports SOB. Recommended checking weight at  home to monitor for fluid retention.  Discussed with patient reaching out to Dr. Mann to discuss dose increase.     ASSESSMENT:    Assessment/Plan   Problem List Items Addressed This Visit       Paroxysmal atrial fibrillation (Multi) - Primary     Xarelto dose is appropriate for the patient based on his CrCl (59 mL/min)           Relevant Orders    Referral to Clinical Pharmacy         RECOMMENDATIONS/PLAN:    CONTINUE   Xarelto 20 mg once daily     Last Appnt with Referring Provider: 6/18/2024  Next Appnt with Referring Provider: needs to be scheduled   Clinical Pharmacist follow up: 3/5/2025  VAF/Application Expiration: Yes    Date: 4/9/2025  Type of Encounter: Renetta Batista  PGY-1 Pharmacy Resident     Verbal consent to manage patient's drug therapy was obtained from the patient . They were informed they may decline to participate or withdraw from participation in pharmacy services at any time.    Continue all meds under the continuation of care with the referring provider and clinical pharmacy team.

## 2025-01-27 ENCOUNTER — HOSPITAL ENCOUNTER (OUTPATIENT)
Dept: CARDIOLOGY | Facility: CLINIC | Age: 70
Discharge: HOME | End: 2025-01-27
Payer: MEDICARE

## 2025-01-27 DIAGNOSIS — I44.30 UNSPECIFIED ATRIOVENTRICULAR BLOCK: ICD-10-CM

## 2025-01-27 DIAGNOSIS — Z95.810 PRESENCE OF AUTOMATIC (IMPLANTABLE) CARDIAC DEFIBRILLATOR: ICD-10-CM

## 2025-02-10 ENCOUNTER — TELEPHONE (OUTPATIENT)
Dept: PHARMACY | Facility: HOSPITAL | Age: 70
End: 2025-02-10
Payer: MEDICARE

## 2025-02-10 NOTE — TELEPHONE ENCOUNTER
Patient reported post-nasal drip and cough started yesterday. Planning to get tested for COVID, flu. Pt would like recommendation for symptom and to sleep at night. Recommended   Coricidin Chest congestion and Cough Q4h (dextromethorphan and guaifenesin)  Benadryl liquid 25 mg by mouth at night

## 2025-02-20 ENCOUNTER — TELEPHONE (OUTPATIENT)
Dept: PHARMACY | Facility: HOSPITAL | Age: 70
End: 2025-02-20
Payer: MEDICARE

## 2025-02-20 PROCEDURE — RXMED WILLOW AMBULATORY MEDICATION CHARGE

## 2025-02-20 NOTE — TELEPHONE ENCOUNTER
Confirmed address and delivery date. Informed patient he could reach out to [facility] pharmacy when he is due for refill.   Patient has trouble cutting spironolactone due to size. Advised patient he could request pharmacy to dispense scored tablet     Kailee NatarajanD   Meds Clinical Pharmacist  Phone: [phone]

## 2025-02-24 ENCOUNTER — PHARMACY VISIT (OUTPATIENT)
Dept: PHARMACY | Facility: CLINIC | Age: 70
End: 2025-02-24
Payer: COMMERCIAL

## 2025-03-05 ENCOUNTER — APPOINTMENT (OUTPATIENT)
Dept: PHARMACY | Facility: HOSPITAL | Age: 70
End: 2025-03-05
Payer: MEDICARE

## 2025-03-20 NOTE — PROGRESS NOTES
Pharmacist Clinic: Cardiology Management    Ananth Gimenez is a 69 y.o. male was referred to Clinical Pharmacy Team for Anticoagulation management.     Referring Provider: Fredis Burks MD    THIS IS A FOLLOW UP PATIENT APPOINTMENT. AT LAST VISIT ON 1/22/25 WITH PHARMACIST (Tonja Batista).    Appointment was completed by Ananth who was reached at primary number.    REVIEW OF PAST APPNT (IF APPLICABLE):   Ananth is currently receiving Xarelto through [facility] with a renewal date of 4/9/25.  During last appointment reported tolerating Xarelto with minimal bruising and bleeding. Discussed interactions with alcohol while using Xarelto.    Allergies   Allergen Reactions    Atorvastatin Myalgia     Pain in extremities. Tolerate low dose pravastatin       Past Medical History:   Diagnosis Date    Heart disease     Hyperlipidemia     Hypertension     Irregular heart beat        Current Outpatient Medications on File Prior to Visit   Medication Sig Dispense Refill    aspirin 81 mg EC tablet Take 1 tablet (81 mg) by mouth once daily.      cephalexin (Keflex) 500 mg capsule Take 1 capsule twice a day for 1 week (Patient not taking: Reported on 1/22/2025) 14 capsule 0    fluticasone (Flonase Allergy Relief) 50 mcg/actuation nasal spray Administer 1 spray into each nostril 2 times a day. Shake gently. Before first use, prime pump. After use, clean tip and replace cap. (Patient taking differently: Administer 1 spray into each nostril 2 times a day as needed for allergies. Shake gently. Before first use, prime pump. After use, clean tip and replace cap.) 16 g 12    furosemide (Lasix) 40 mg tablet Take 1 tablet (40 mg) by mouth once daily.      Klor-Con M20 20 mEq ER tablet TAKE 1 TABLET BY MOUTH EVERY DAY 90 tablet 3    losartan (Cozaar) 50 mg tablet TAKE 1 TABLET BY MOUTH EVERY DAY 30 tablet 3    metoprolol succinate XL (Toprol-XL) 50 mg 24 hr tablet Dispense only 10 tablets please. 90 tablet 3    mirabegron (Myrbetriq) 50 mg  "tablet extended release 24 hr 24 hr tablet Take 1 tablet (50 mg) by mouth once daily as needed (urinary frequency).      multivitamin with minerals (multivit-min-iron fum-folic ac) tablet Take 1 tablet by mouth every other day.      pravastatin (Pravachol) 10 mg tablet TAKE 1 TABLET BY MOUTH EVERY DAY 30 tablet 3    rivaroxaban (Xarelto) 20 mg tablet Take 1 tablet (20 mg) by mouth once daily in the evening. Take with meals. 180 tablet 3    sildenafil (Revatio) 20 mg tablet Take 1 tablet (20 mg) by mouth if needed.      spironolactone (Aldactone) 25 mg tablet Take 0.5 tablets (12.5 mg) by mouth once daily. TAKE 0.5 TABLET BY MOUTH ONCE A DAY. 45 tablet 3     No current facility-administered medications on file prior to visit.         RELEVANT LAB RESULTS:  Lab Results   Component Value Date    BILITOT 0.8 07/12/2024    CALCIUM 9.7 07/12/2024    CO2 29 07/12/2024    CL 97 (L) 07/12/2024    CREATININE 1.32 (H) 07/12/2024    GLUCOSE 60 (L) 07/12/2024    ALKPHOS 91 07/12/2024    K 4.5 07/12/2024    PROT 7.2 07/12/2024     (L) 07/12/2024    AST 17 07/12/2024    ALT 12 07/12/2024    BUN 28 (H) 07/12/2024    ANIONGAP 12 07/12/2024    MG 2.09 05/25/2024    PHOS 3.4 05/25/2024    ALBUMIN 4.3 07/12/2024    GFRMALE 54 (A) 03/07/2023     Lab Results   Component Value Date    TRIG 62 08/17/2019    CHOL 129 08/17/2019    HDL 45.5 08/17/2019     No results found for: \"BMCBC\", \"CBCDIF\"     PHARMACEUTICAL ASSESSMENT:    MEDICATION RECONCILIATION    Was a medication reconciliation completed at this visit? No    Drug Interactions? No    Medication Documentation Review Audit       Reviewed by Yessica Parisi MA (Medical Assistant) on 06/18/24 at 1359      Medication Order Taking? Sig Documenting Provider Last Dose Status   acetaminophen (Tylenol) 500 mg tablet [id number] Yes Take 2 tablets (1,000 mg) by mouth every 6 hours if needed for mild pain (1 - 3). Remy Roberson MD Taking Active   aspirin 81 mg EC tablet [id number] Yes Take 1 " tablet (81 mg) by mouth once daily. Historical Provider, MD Taking Active   cephalexin (Keflex) 500 mg capsule [id number]  Take 1 capsule twice a day for 1 week Fredis Burks MD  Flag for Review   fluticasone (Flonase Allergy Relief) 50 mcg/actuation nasal spray [id number] Yes Administer 1 spray into each nostril 2 times a day. Shake gently. Before first use, prime pump. After use, clean tip and replace cap. Andrés Elder MD Taking Active   furosemide (Lasix) 40 mg tablet [id number]  Take 1 tablet (40 mg) by mouth once daily. Andrés Elder MD  Active   Klor-Con M20 20 mEq ER tablet [id number]  Take 1 tablet (20 mEq) by mouth once daily at bedtime. Historical Provider, MD  Active   losartan (Cozaar) 50 mg tablet [id number]  TAKE 1 TABLET BY MOUTH EVERY DAY Da Mann MD  Active   metoprolol succinate XL (Toprol-XL) 50 mg 24 hr tablet [id number]  Dispense only 10 tablets please. Fredis Burks MD  Active   metoprolol tartrate (Lopressor) 25 mg tablet [id number]  TAKE 1 TABLET BY MOUTH EVERY 12 HOURS AS DIRECTED Da Mann MD  Active   multivitamin with minerals (multivit-min-iron fum-folic ac) tablet [id number]  Take 1 tablet by mouth every other day. Historical Provider, MD  Active   pravastatin (Pravachol) 10 mg tablet [id number]  TAKE 1 TABLET BY MOUTH EVERY DAY Da Mann MD  Active   rivaroxaban (Xarelto) 20 mg tablet [id number]  Take 1 tablet (20 mg) by mouth once daily in the evening. Take with meals. Da Mann MD  Active   sildenafil (Revatio) 20 mg tablet [id number]  Take 1 tablet (20 mg) by mouth if needed. Historical Provider, MD  Active   spironolactone (Aldactone) 25 mg tablet [id number]  Take 0.5 tablet once a day. Please dispanse 6 tablets Fredis Burks MD  Active                    DISEASE MANAGEMENT ASSESSMENT:     ANTICOAGULATION ASSESSMENT    The ASCVD Risk score (Hema DK, et al., 2019) failed to calculate for the following reasons:    Cannot find a previous HDL lab    Cannot find a  previous total cholesterol lab    DIAGNOSIS: prevention of nonvalvular atrial fibrilliation stroke and systemic embolism  - Patient is projected to be on anticoagulation indefinitely  - [id number] Score: [3] (only included if diagnosis is atrial fibrillation)   Age: [<65 (0)] [65-74 (+1)] [> 75 (+2)]: 1  Sex: [Male/Female (+1)]: 0  CHF history: [No/Yes(+1)]: 1  Hypertension history: [No/Yes(+1)]: 1  Stroke/TIA/thromboembolism history: [No/Yes(+2)]: 0  Vascular disease history (prior MI, peripheral artery disease, aortic plaque): [No/Yes(+1)]: 0  Diabetes history: [No/Yes(+1)]: 0     CURRENT PHARMACOTHERAPY:    Xarelto 20 mg daily  CrCl 59ml/min    RELEVANT PAST MEDICAL HISTORY:   A-fib, HTN, HLD, HFpEF, ZIGGY    Affordability/Accessibility:  PAP  Adherence/Organization: reports adherence  Adverse Reactions: none reported  Recent Hospitalizations: none reported  Recent Falls/Trauma: none reported  Changes in Tobacco or Alcohol Intake:   Tobacco: does not use  Alcohol: 1-2 drinks per week.    EDUCATION/COUNSELING:   - Counseled patient on MOA, expectations, duration of therapy, contraindications, administration, and monitoring parameters  - Counseled patient of side effects that are indicative of bleeding such as dark tarry stool, unexplainable bruising, or vomiting up a coffee ground like substance       DISCUSSION/NOTES:   Reports adherence with Xarelto. No bruising or bleeding noted at today's visit.  Patient is due for renewal application for  PAP.    ASSESSMENT:    Assessment/Plan   Problem List Items Addressed This Visit    None     Patient Assistance Program (PAP) - RENEWAL     Per regulation, patient is due for their annual renewal for their  PAP application. Patient's application is due for renewal by 4/9/25. Patient understands that if proper documentation is not received before renewal date, they will no longer be able to receive approved medication(s) free of charge.     Application for program to  be submitted for the following medications: Xarelto    [facility] [place]   Prescription Insurance:   Yes   Members of Household: 2   Files Taxes: Yes       Patient will be faxing financial information to pharmacist directly at [phone].    Patient verbally reports monthly or yearly income which is less than 400% federal poverty level    Patient aware this process may take up to 6 weeks.     If approved medication must be filled through [facility] and MEDICATION WILL BE MAILED TO PATIENT.     RECOMMENDATIONS/PLAN:    CONTINUE  Xarelto 20mg daily    Last Appnt with Referring Provider: 6/18/24  Next Appnt with Referring Provider: needs scheduling  Clinical Pharmacist follow up: 9/19/25  VAF/Application Expiration: Yes    Date: 4/9/25  Type of Encounter: Renetta Saunders PharmD    Verbal consent to manage patient's drug therapy was obtained from the patient . They were informed they may decline to participate or withdraw from participation in pharmacy services at any time.    Continue all meds under the continuation of care with the referring provider and clinical pharmacy team.

## 2025-03-21 ENCOUNTER — APPOINTMENT (OUTPATIENT)
Dept: PHARMACY | Facility: HOSPITAL | Age: 70
End: 2025-03-21
Payer: MEDICARE

## 2025-03-21 DIAGNOSIS — I48.0 PAROXYSMAL ATRIAL FIBRILLATION (MULTI): ICD-10-CM

## 2025-04-11 ENCOUNTER — TELEPHONE (OUTPATIENT)
Dept: CARDIOLOGY | Facility: HOSPITAL | Age: 70
End: 2025-04-11

## 2025-04-11 DIAGNOSIS — I50.30 HEART FAILURE WITH PRESERVED LEFT VENTRICULAR FUNCTION (HFPEF): ICD-10-CM

## 2025-04-11 RX ORDER — SPIRONOLACTONE 25 MG/1
12.5 TABLET ORAL DAILY
Qty: 45 TABLET | Refills: 3 | Status: SHIPPED | OUTPATIENT
Start: 2025-04-11 | End: 2026-04-11

## 2025-04-14 DIAGNOSIS — I50.30 HEART FAILURE WITH PRESERVED LEFT VENTRICULAR FUNCTION (HFPEF): ICD-10-CM

## 2025-04-14 DIAGNOSIS — I48.0 PAROXYSMAL ATRIAL FIBRILLATION (MULTI): ICD-10-CM

## 2025-04-14 RX ORDER — METOPROLOL SUCCINATE 50 MG/1
TABLET, EXTENDED RELEASE ORAL
Qty: 90 TABLET | Refills: 3 | Status: SHIPPED | OUTPATIENT
Start: 2025-04-14

## 2025-04-21 ENCOUNTER — OFFICE VISIT (OUTPATIENT)
Dept: CARDIOLOGY | Facility: CLINIC | Age: 70
End: 2025-04-21
Payer: MEDICARE

## 2025-04-21 VITALS
BODY MASS INDEX: 31.22 KG/M2 | HEIGHT: 60 IN | OXYGEN SATURATION: 98 % | WEIGHT: 159 LBS | SYSTOLIC BLOOD PRESSURE: 122 MMHG | DIASTOLIC BLOOD PRESSURE: 78 MMHG | HEART RATE: 80 BPM

## 2025-04-21 DIAGNOSIS — Z95.2 S/P MVR (MITRAL VALVE REPLACEMENT): ICD-10-CM

## 2025-04-21 DIAGNOSIS — I50.30 HEART FAILURE WITH PRESERVED LEFT VENTRICULAR FUNCTION (HFPEF): ICD-10-CM

## 2025-04-21 DIAGNOSIS — I10 ESSENTIAL (PRIMARY) HYPERTENSION: ICD-10-CM

## 2025-04-21 DIAGNOSIS — I50.20 HFREF (HEART FAILURE WITH REDUCED EJECTION FRACTION): ICD-10-CM

## 2025-04-21 DIAGNOSIS — I25.10 MILD CAD: ICD-10-CM

## 2025-04-21 DIAGNOSIS — Z98.890 S/P LEFT ATRIAL APPENDAGE LIGATION: ICD-10-CM

## 2025-04-21 DIAGNOSIS — I25.10 ATHEROSCLEROTIC HEART DISEASE OF NATIVE CORONARY ARTERY WITHOUT ANGINA PECTORIS: ICD-10-CM

## 2025-04-21 DIAGNOSIS — I1A.0 RESISTANT HYPERTENSION: ICD-10-CM

## 2025-04-21 DIAGNOSIS — R06.09 DOE (DYSPNEA ON EXERTION): ICD-10-CM

## 2025-04-21 DIAGNOSIS — I10 HYPERTENSION, UNSPECIFIED TYPE: Primary | ICD-10-CM

## 2025-04-21 DIAGNOSIS — R73.9 ELEVATED BLOOD SUGAR: ICD-10-CM

## 2025-04-21 DIAGNOSIS — I48.0 PAROXYSMAL ATRIAL FIBRILLATION (MULTI): ICD-10-CM

## 2025-04-21 PROCEDURE — 3074F SYST BP LT 130 MM HG: CPT | Performed by: INTERNAL MEDICINE

## 2025-04-21 PROCEDURE — 99214 OFFICE O/P EST MOD 30 MIN: CPT | Performed by: INTERNAL MEDICINE

## 2025-04-21 PROCEDURE — 3008F BODY MASS INDEX DOCD: CPT | Performed by: INTERNAL MEDICINE

## 2025-04-21 PROCEDURE — 1036F TOBACCO NON-USER: CPT | Performed by: INTERNAL MEDICINE

## 2025-04-21 PROCEDURE — 3078F DIAST BP <80 MM HG: CPT | Performed by: INTERNAL MEDICINE

## 2025-04-21 PROCEDURE — 93005 ELECTROCARDIOGRAM TRACING: CPT | Performed by: INTERNAL MEDICINE

## 2025-04-21 PROCEDURE — 1126F AMNT PAIN NOTED NONE PRSNT: CPT | Performed by: INTERNAL MEDICINE

## 2025-04-21 PROCEDURE — 1159F MED LIST DOCD IN RCRD: CPT | Performed by: INTERNAL MEDICINE

## 2025-04-21 RX ORDER — SPIRONOLACTONE 25 MG/1
25 TABLET ORAL DAILY
Qty: 90 TABLET | Refills: 1 | Status: SHIPPED | OUTPATIENT
Start: 2025-04-21 | End: 2026-04-21

## 2025-04-21 RX ORDER — LOSARTAN POTASSIUM 50 MG/1
50 TABLET ORAL DAILY
Qty: 30 TABLET | Refills: 3 | Status: SHIPPED | OUTPATIENT
Start: 2025-04-21

## 2025-04-21 RX ORDER — FUROSEMIDE 40 MG/1
40 TABLET ORAL DAILY
Qty: 90 TABLET | Refills: 3 | Status: SHIPPED | OUTPATIENT
Start: 2025-04-21 | End: 2026-04-21

## 2025-04-21 RX ORDER — PRAVASTATIN SODIUM 10 MG/1
10 TABLET ORAL DAILY
Qty: 90 TABLET | Refills: 3 | Status: SHIPPED | OUTPATIENT
Start: 2025-04-21 | End: 2026-04-21

## 2025-04-21 RX ORDER — METOPROLOL SUCCINATE 50 MG/1
TABLET, EXTENDED RELEASE ORAL
Qty: 90 TABLET | Refills: 3 | Status: SHIPPED | OUTPATIENT
Start: 2025-04-21

## 2025-04-21 ASSESSMENT — ENCOUNTER SYMPTOMS
ALTERED MENTAL STATUS: 0
FALLS: 0
MYALGIAS: 0
CHILLS: 0
OCCASIONAL FEELINGS OF UNSTEADINESS: 0
HEMOPTYSIS: 0
VOMITING: 0
BLOATING: 0
CONSTIPATION: 0
FEVER: 0
DEPRESSION: 0
DYSURIA: 0
MEMORY LOSS: 0
LOSS OF SENSATION IN FEET: 0
HEMATURIA: 0
ABDOMINAL PAIN: 0
DIARRHEA: 0
NAUSEA: 0
HEADACHES: 0
WHEEZING: 0
COUGH: 0

## 2025-04-21 ASSESSMENT — PAIN SCALES - GENERAL: PAINLEVEL_OUTOF10: 0-NO PAIN

## 2025-04-21 NOTE — PROGRESS NOTES
Chief Complaint   Patient presents with    Follow-up    Valve Disorder    Atrial Fibrillation       HPI  70 yo WM w/ h/o MS/MR s/p MVR 11/21 (29mm Epic biop; postop anemia), mild CAD, parox AFIB s/p MAZE/ASHLEY lig 11/21, s/p DCCV 3/21 + 1/22, HFrEF/CM s/p BiV-ICD, HTN, ZIGGY (CPAP), ED now here for cardiology f/u. Breathing improved s/p MVR; however, he remains with HESS w/ walking, less on increased Lasix. No chest pain. No dyspnea at rest. No orthopnea/PND. No palps. No LH/dizziness/syncope. +occ mild LE edema. No claudication. No cough. +occ fatigue.  He walks for exercise with no CP, occ HESS.   ECG 12/21: TDS, ?ATach (91), PVCs, RBBB  ECG 1/22: AFIB (96), PVC  ECG 1/22: V-paced (95)  ECG 1/22: V-paced (86), PVC, ?SR  ECG 2/22: V-paced (82), SR  ECG 3/23: V-paced (63), ?SR  ECG 5/24: WCR w/ bigem V-pacing, IMI  ECG 5/24: V-paced (76)  ECG 4/25: V-paced (83)  Echo 11/20: EF 55-60%, mod MS, mod MR, PASP 48  CESAR 11/21: EF 60-65%, mod-sev LAE, mod MAC, sev MS, mod MR (post MVR: no MR)  Echo 11/21: TDS, EF 40-45%, MVR, no MR  Echo 11/21: TDS, EF 50-55%, MVR (23/7)  Echo 3/24: EF 25-30%, RV dysfxn, sev LAE, mild AS, mild AI  CESAR 5/24: EF 35%, mild RV dysfxn, mild-mod LAE, mild-mod TR  Nuc 12/19: no ischemia, inf scar, EF 46%  Nuc 4/24: no ischemia, apex scar, EF 38%  Cath 12/19: mRCA 60%  Cath 9/21: pLAD 10-30%, pOM1 30%, LVEDP 21, PCW 24, PA 47/17/33, CO 4.5, CI 3.2  CXR 11/21: mild vasc angel + int edema, sm B pl eff  CXR 12/21: inc LLL airspace opacit, sm B pleur opac  CXR 7/22: small pleur eff, atelectasis  CXR 5/24: tr/sm B pleur eff  CTA chest 3/21: mild cor calc, CM, no peric eff, mod R pl eff, sm L pl eff, no PE  PM check 12/21: A pace <1%, V pace 28%  PM check 1/22: AT/AF >99%  PM check 2/22: A pace <1%, V pace 97%, no arrhythmia   PM check 2/25: AsBiV pace, 0 VT/VF, <1% AT/AF, PVCs 9%    Review of Systems   Constitutional: Negative for chills, fever and malaise/fatigue.   HENT:  Negative for hearing loss.    Eyes:   "Negative for visual disturbance.   Respiratory:  Negative for cough, hemoptysis and wheezing.    Skin:  Negative for rash.   Musculoskeletal:  Negative for falls and myalgias.   Gastrointestinal:  Negative for bloating, abdominal pain, constipation, diarrhea, dysphagia, nausea and vomiting.   Genitourinary:  Negative for dysuria and hematuria.   Neurological:  Negative for headaches.   Psychiatric/Behavioral:  Negative for altered mental status, depression and memory loss.       Social History     Tobacco Use    Smoking status: Never    Smokeless tobacco: Never   Substance Use Topics    Alcohol use: Yes      Family History[1]     RX Allergies[2]     Current Outpatient Medications   Medication Instructions    aspirin 81 mg, Daily    fluticasone (Flonase Allergy Relief) 50 mcg/actuation nasal spray 1 spray, Each Nostril, 2 times daily, Shake gently. Before first use, prime pump. After use, clean tip and replace cap.    furosemide (LASIX) 40 mg, oral, Daily    Klor-Con M20 20 mEq ER tablet 20 mEq, oral, Daily    losartan (COZAAR) 50 mg, oral, Daily    metoprolol succinate XL (Toprol-XL) 50 mg 24 hr tablet Dispense only 10 tablets please.    mirabegron (MYRBETRIQ) 50 mg, Daily PRN    multivitamin with minerals (multivit-min-iron fum-folic ac) tablet 1 tablet, Every other day    pravastatin (PRAVACHOL) 10 mg, oral, Daily    sildenafil (REVATIO) 20 mg, As needed    spironolactone (ALDACTONE) 12.5 mg, oral, Daily, TAKE 0.5 TABLET BY MOUTH ONCE A DAY.    Xarelto 20 mg, oral, Daily with evening meal      /78 (BP Location: Right arm, Patient Position: Sitting, BP Cuff Size: Child)   Pulse 80   Ht 1.27 m (4' 2\")   Wt 72.1 kg (159 lb)   SpO2 98%   BMI 44.72 kg/m²       Physical Exam  Constitutional:       Appearance: Normal appearance.   HENT:      Head: Normocephalic and atraumatic.      Nose: Nose normal.   Neck:      Vascular: No carotid bruit.   Cardiovascular:      Rate and Rhythm: Normal rate and regular " rhythm.      Heart sounds: No murmur heard.  Pulmonary:      Effort: Pulmonary effort is normal.      Breath sounds: Normal breath sounds.   Abdominal:      Palpations: Abdomen is soft.      Tenderness: There is no abdominal tenderness.   Musculoskeletal:      Right lower leg: Edema present.      Left lower leg: Edema present.      Comments: Tr LE edema   Skin:     General: Skin is warm and dry.   Neurological:      General: No focal deficit present.      Mental Status: He is alert.   Psychiatric:         Mood and Affect: Mood normal.         Judgment: Judgment normal.        Results/Data  7/24 Cr 1.32, K 4.5  5/24 Cr 1.09, K 4.0, Mg 2.09, HGB 12.5, , TSH 1.54  3/23 Cr 1.42, K 4.2, HGB 14.4,   7/22 Cr 1.32, K 4.3, Mg 2.05, LFT nl, HB 13.8, , Yves 109, TSH 1.5,   11/21 Cr 1.13, K 2.9 -> 4.2, Mg 1.8, HGB 9.1,   4/21 TPN neg,   3/21 HGB 16.4, , TSH 1.56  10/19   8/19 LDL 71, HDL 45, TG 62, Chol 129     Assessment/Plan   70 yo WM w/ h/o MS/MR s/p MVR 11/21 (29mm Epic biop; postop anemia), mild CAD, parox AFIB s/p MAZE/ASHLEY lig 11/21, s/p DCCV 3/21 + 1/22, HFrEF/CM s/p BiV-ICD, HTN, ZIGGY (CPAP), ED. Doing fair. Appears near compensated (of note, he did not take his Lasix this AM). Check echo. If EF remains low, uptitrate meds more.  -continue ASA 81 qd (with food); can stop if any bleeding concern  -continue Xarelto 20 qd  -continue Metoprolol Succinate 50 every day (consider increase if EF still low)  -continue Losartan 50 every day (consider change to Entresto if EF still low)  -continue Furosemide 40 qd  -increase Henrik from 12.5 to 25 every day; stop KCL -> check labs in 1 week  -consider Farxiga or Jardiance leoncio if EF remains low  -continue Prava 10 qhs (myalgias on Atorva) -> goal LDL <70  -continue PM checks  -f/u 3-4 months (earlier if needed)    Da Mann MD       [1]   Family History  Problem Relation Name Age of Onset    Heart failure Mother     [2]    Allergies  Allergen Reactions    Atorvastatin Myalgia     Pain in extremities. Tolerate low dose pravastatin

## 2025-04-22 LAB
ATRIAL RATE: 277 BPM
P AXIS: 67 DEGREES
P OFFSET: 165 MS
P ONSET: 143 MS
Q ONSET: 195 MS
QRS COUNT: 14 BEATS
QRS DURATION: 136 MS
QT INTERVAL: 450 MS
QTC CALCULATION(BAZETT): 528 MS
QTC FREDERICIA: 501 MS
R AXIS: 129 DEGREES
T AXIS: -43 DEGREES
T OFFSET: 420 MS
VENTRICULAR RATE: 83 BPM

## 2025-04-24 ENCOUNTER — HOSPITAL ENCOUNTER (OUTPATIENT)
Dept: CARDIOLOGY | Facility: CLINIC | Age: 70
Discharge: HOME | End: 2025-04-24
Payer: MEDICARE

## 2025-04-24 LAB
EJECTION FRACTION APICAL 4 CHAMBER: 43.8
EJECTION FRACTION: 39 %
LEFT ATRIUM VOLUME AREA LENGTH INDEX BSA: 24.6 ML/M2
LEFT VENTRICLE INTERNAL DIMENSION DIASTOLE: 5.33 CM (ref 3.5–6)
LEFT VENTRICULAR OUTFLOW TRACT DIAMETER: 1.9 CM
RIGHT VENTRICLE FREE WALL PEAK S': 8 CM/S
RIGHT VENTRICLE PEAK SYSTOLIC PRESSURE: 40.6 MMHG
TRICUSPID ANNULAR PLANE SYSTOLIC EXCURSION: 1.4 CM

## 2025-04-24 PROCEDURE — 93308 TTE F-UP OR LMTD: CPT

## 2025-04-24 PROCEDURE — 93325 DOPPLER ECHO COLOR FLOW MAPG: CPT

## 2025-04-24 PROCEDURE — 93321 DOPPLER ECHO F-UP/LMTD STD: CPT

## 2025-04-24 PROCEDURE — 2500000004 HC RX 250 GENERAL PHARMACY W/ HCPCS (ALT 636 FOR OP/ED): Mod: JZ | Performed by: INTERNAL MEDICINE

## 2025-04-24 RX ADMIN — PERFLUTREN 10 ML OF DILUTION: 6.52 INJECTION, SUSPENSION INTRAVENOUS at 14:00

## 2025-05-07 DIAGNOSIS — I48.0 PAROXYSMAL ATRIAL FIBRILLATION (MULTI): ICD-10-CM

## 2025-05-12 ENCOUNTER — TELEPHONE (OUTPATIENT)
Dept: PHARMACY | Facility: HOSPITAL | Age: 70
End: 2025-05-12
Payer: MEDICARE

## 2025-05-12 PROCEDURE — RXMED WILLOW AMBULATORY MEDICATION CHARGE

## 2025-05-12 NOTE — TELEPHONE ENCOUNTER
Patient Assistance Program Approval:     We are pleased to inform you that your application for assistance has been approved.     This approval is valid through 5/12/26 as long as the following criteria continue to be satisfied:     Your medication (Xarelto) remains covered under your current insurance plan.   Your prescriber does not discontinue therapy.   You do not seek reimbursement from any other private or government-funded programs for the  medication.    Under this program, the pharmacy will first bill your insurance plan for your indemnified specified medication. The [employer] Fund will then offset your copay balance, so that your out-of pocket expense for your specialty medication will be $0.00.    Rene Saunders, PharmD

## 2025-05-16 ENCOUNTER — PHARMACY VISIT (OUTPATIENT)
Dept: PHARMACY | Facility: CLINIC | Age: 70
End: 2025-05-16
Payer: COMMERCIAL

## 2025-05-23 PROBLEM — M25.569 KNEE PAIN: Status: ACTIVE | Noted: 2025-05-23

## 2025-05-23 PROBLEM — R06.09 DYSPNEA ON EXERTION: Status: ACTIVE | Noted: 2025-05-23

## 2025-05-23 PROBLEM — M46.1 INFLAMMATION OF SACROILIAC JOINT: Status: ACTIVE | Noted: 2025-05-23

## 2025-05-23 PROBLEM — G47.00 HYPOSOMNIA: Status: ACTIVE | Noted: 2025-05-23

## 2025-05-23 PROBLEM — T14.8XXA WOUND PAIN: Status: ACTIVE | Noted: 2025-05-23

## 2025-05-23 PROBLEM — R94.31 ABNORMAL ELECTROCARDIOGRAPHY: Status: ACTIVE | Noted: 2021-04-28

## 2025-05-23 PROBLEM — D64.9 ANEMIA: Status: ACTIVE | Noted: 2025-05-23

## 2025-05-27 ENCOUNTER — OFFICE VISIT (OUTPATIENT)
Dept: CARDIOLOGY | Facility: CLINIC | Age: 70
End: 2025-05-27
Payer: MEDICARE

## 2025-05-27 VITALS
WEIGHT: 157.2 LBS | OXYGEN SATURATION: 96 % | DIASTOLIC BLOOD PRESSURE: 86 MMHG | HEIGHT: 60 IN | HEART RATE: 83 BPM | BODY MASS INDEX: 30.86 KG/M2 | SYSTOLIC BLOOD PRESSURE: 143 MMHG

## 2025-05-27 DIAGNOSIS — Z95.2 S/P MVR (MITRAL VALVE REPLACEMENT): ICD-10-CM

## 2025-05-27 DIAGNOSIS — I50.20 HFREF (HEART FAILURE WITH REDUCED EJECTION FRACTION): Primary | ICD-10-CM

## 2025-05-27 PROCEDURE — 99214 OFFICE O/P EST MOD 30 MIN: CPT | Performed by: INTERNAL MEDICINE

## 2025-05-27 PROCEDURE — 3077F SYST BP >= 140 MM HG: CPT | Performed by: INTERNAL MEDICINE

## 2025-05-27 PROCEDURE — 3008F BODY MASS INDEX DOCD: CPT | Performed by: INTERNAL MEDICINE

## 2025-05-27 PROCEDURE — 1126F AMNT PAIN NOTED NONE PRSNT: CPT | Performed by: INTERNAL MEDICINE

## 2025-05-27 PROCEDURE — 3079F DIAST BP 80-89 MM HG: CPT | Performed by: INTERNAL MEDICINE

## 2025-05-27 PROCEDURE — 1159F MED LIST DOCD IN RCRD: CPT | Performed by: INTERNAL MEDICINE

## 2025-05-27 PROCEDURE — 99212 OFFICE O/P EST SF 10 MIN: CPT | Performed by: INTERNAL MEDICINE

## 2025-05-27 RX ORDER — BISOPROLOL FUMARATE 5 MG/1
5 TABLET, FILM COATED ORAL DAILY
Qty: 90 TABLET | Refills: 3 | Status: SHIPPED | OUTPATIENT
Start: 2025-05-27 | End: 2026-05-27

## 2025-05-27 ASSESSMENT — PAIN SCALES - GENERAL: PAINLEVEL_OUTOF10: 0-NO PAIN

## 2025-05-27 ASSESSMENT — PATIENT HEALTH QUESTIONNAIRE - PHQ9
SUM OF ALL RESPONSES TO PHQ9 QUESTIONS 1 AND 2: 0
2. FEELING DOWN, DEPRESSED OR HOPELESS: NOT AT ALL
1. LITTLE INTEREST OR PLEASURE IN DOING THINGS: NOT AT ALL

## 2025-05-27 ASSESSMENT — ENCOUNTER SYMPTOMS
DEPRESSION: 0
OCCASIONAL FEELINGS OF UNSTEADINESS: 0
LOSS OF SENSATION IN FEET: 0

## 2025-05-27 NOTE — PROGRESS NOTES
Subjective   Ananth Gimenez is a 69 y.o. male.    HPI  68 yo WM w/ h/o MS/MR s/p MVR 11/21 (29mm Epic biop; postop anemia), mild CAD, parox AFIB s/p MAZE/ASHLEY lig 11/21, s/p DCCV 3/21 + 1/22, HFrEF/CM s/p BiV-ICD, HTN, ZIGGY (CPAP), ED now here for cardiology f/u.     Last seen 4/2025. At that time his breathing improved s/p MVR; however, he remained with HESS w/ walking, less on increased Lasix. Otherwise asymptomatic. [place] increased to 25 daily and KCL stopped. TTE was ordered to reevaluate EF which in the interval was performed and showed modest EF improvement from 25-30% to 39%.     Today pt reports some fatigue with walking and some weight gain. Weight is currently 157 lbs near that of last clinic visit 159 lbs. He states when he walks he has to stop to rest frequently but does not attribute this to SOB. He feels he is deconditioned/out of shape. He did participate in cardiac rehab after his surgery but may have cut it short due to issues with his FMLA leave. He is open to trying again as he did feel better during the program. Otherwise denies CP, lightheadedness and SOB at rest. ROS otherwise negative.    BP in clinic 146/80 and 143/86 on repeat today. Pt reports not taking his BP meds yet.     Cardiac Meds:  ASA 81  Lasix 40 od  Losartan 50 od  Metop succ 50 od  Prava 10 od  [place] 25 od  Xarelto 20 od    Cardiac Testing:  ECG 12/21: TDS, ?ATach (91), PVCs, RBBB  ECG 1/22: AFIB (96), PVC  ECG 1/22: V-paced (95)  ECG 1/22: V-paced (86), PVC, ?SR  ECG 2/22: V-paced (82), SR  ECG 3/23: V-paced (63), ?SR  ECG 5/24: WCR w/ bigem V-pacing, IMI  ECG 5/24: V-paced (76)  ECG 4/25: V-paced (83)  Echo 11/20: EF 55-60%, mod MS, mod MR, PASP 48  CESAR 11/21: EF 60-65%, mod-sev LAE, mod MAC, sev MS, mod MR (post MVR: no MR)  Echo 11/21: TDS, EF 40-45%, MVR, no MR  Echo 11/21: TDS, EF 50-55%, MVR (23/7)  Echo 3/24: EF 25-30%, RV dysfxn, sev LAE, mild AS, mild AI  CESAR 5/24: EF 35%, mild RV dysfxn, mild-mod LAE, mild-mod  "TR  Echo 5/25 EF 39% global hypo, MVR present, gradients not measured   Nuc 12/19: no ischemia, inf scar, EF 46%  Nuc 4/24: no ischemia, apex scar, EF 38%  Cath 12/19: mRCA 60%  Cath 9/21: pLAD 10-30%, pOM1 30%, LVEDP 21, PCW 24, PA 47/17/33, CO 4.5, CI 3.2  CXR 11/21: mild vasc angel + int edema, sm B pl eff  CXR 12/21: inc LLL airspace opacit, sm B pleur opac  CXR 7/22: small pleur eff, atelectasis  CXR 5/24: tr/sm B pleur eff  CTA chest 3/21: mild cor calc, CM, no peric eff, mod R pl eff, sm L pl eff, no PE  PM check 12/21: A pace <1%, V pace 28%  PM check 1/22: AT/AF >99%  PM check 2/22: A pace <1%, V pace 97%, no arrhythmia   PM check 2/25: AsBiV pace, 0 VT/VF, <1% AT/AF, PVCs 9%    Objective   Visit Vitals  /86 (BP Location: Right arm, Patient Position: Sitting, BP Cuff Size: Adult)   Pulse 83   Ht 1.27 m (4' 2\")   Wt 71.3 kg (157 lb 3.2 oz)   SpO2 96%   BMI 44.21 kg/m²   Smoking Status Never   BSA 1.59 m²       Physical Exam  GEN: NAD, pleasant, achondroplasia  NEURO: nonfocal, AAOx3  CV: RRR, S1/S2, no mrg, no JVD, good pulse volume  PULM: Lungs CTAB, no wrr, no increased wob on RA  GI: soft nt/nd, +BS  EXT: trace BLE edema      Lab Review:   7/24 Cr 1.32, K 4.5  5/24 Cr 1.09, K 4.0, Mg 2.09, HGB 12.5, , TSH 1.54  3/23 Cr 1.42, K 4.2, HGB 14.4,   7/22 Cr 1.32, K 4.3, Mg 2.05, LFT nl, HB 13.8, , Yves 109, TSH 1.5,   11/21 Cr 1.13, K 2.9 -> 4.2, Mg 1.8, HGB 9.1,   4/21 TPN neg,   3/21 HGB 16.4, , TSH 1.56  10/19   8/19 LDL 71, HDL 45, TG 62, Chol 129     Assessment/Plan   70 yo WM w/ h/o MS/MR s/p MVR 11/21 (29mm Epic biop; postop anemia), mild CAD, parox AFIB s/p MAZE/ASHLEY lig 11/21, s/p DCCV 3/21 + 1/22, HFrEF/CM s/p BiV-ICD, HTN, ZIGGY (CPAP), ED presenting for f/u.    Appears compensated. SBP 140s but pt did not take AM losartan.  EF now 39% on echo 4/2025 up from 25-30%. Pt open to tuning up medications and retrialing cardiac rehab; will switch " losartan to entresto, switch metop to bisolprolol to avoid potential fatigue side effect, and start jardiance. Labs in 2 weeks. f/u in 3 months.    -continue ASA 81 qd (with food); can stop if any bleeding concern  -continue Xarelto 20 qd  -switch metop succ 50 to bisoprolol 5mg daily to avoid potential fatigue side effect  -switch losartan to entresto 24-26 BID   -start jardiance 12.5 od  -continue Furosemide 40 qd  -continue [place] 25 od  -continue Prava 10 qhs (myalgias on Atorva) -> goal LDL <70  -cardiac rehab referral ordered  -f/u 3 months (earlier if needed)    Moi Cid MD/PHD    I saw and evaluated the patient. I personally obtained the key and critical portions of the history and physical exam or was physically present for key and critical portions performed by the resident/fellow. I reviewed the resident/fellow's documentation and discussed the patient with the resident/fellow. I agree with the resident/fellow's medical decision making as documented in the note.    Da Mann MD

## 2025-05-27 NOTE — PATIENT INSTRUCTIONS
It was a pleasure seeing you today!     We sent new prescriptions to the [facility] on [address].    Please STOP losartan and metoprolol    Please START entresto 24-26mg twice daily (will replace losartan)  Please START bisoprolol 5mg once daily (may help reduce fatigue symptoms)  Please START jardiance 10mg once daily (new medication, good for the heart)    Please be on the look out for excessive lightheadedness/dizziness. If you develop these symptoms please stop jardiance first and let us know.    I also sent a new referral for cardiac rehab.    Come back and see us in 3 months.

## 2025-06-06 ENCOUNTER — TELEPHONE (OUTPATIENT)
Dept: CARDIOLOGY | Facility: HOSPITAL | Age: 70
End: 2025-06-06
Payer: MEDICARE

## 2025-06-06 DIAGNOSIS — I50.20 HFREF (HEART FAILURE WITH REDUCED EJECTION FRACTION): ICD-10-CM

## 2025-06-09 DIAGNOSIS — I50.20 HFREF (HEART FAILURE WITH REDUCED EJECTION FRACTION): ICD-10-CM

## 2025-06-09 RX ORDER — BISOPROLOL FUMARATE 5 MG/1
5 TABLET, FILM COATED ORAL DAILY
Qty: 90 TABLET | Refills: 3 | Status: SHIPPED | OUTPATIENT
Start: 2025-06-09 | End: 2026-06-09

## 2025-06-10 DIAGNOSIS — I50.20 HFREF (HEART FAILURE WITH REDUCED EJECTION FRACTION): ICD-10-CM

## 2025-06-10 DIAGNOSIS — Z95.2 S/P MVR (MITRAL VALVE REPLACEMENT): ICD-10-CM

## 2025-06-27 ENCOUNTER — TELEPHONE (OUTPATIENT)
Dept: CARDIOLOGY | Facility: HOSPITAL | Age: 70
End: 2025-06-27

## 2025-06-27 DIAGNOSIS — I50.30 HEART FAILURE WITH PRESERVED LEFT VENTRICULAR FUNCTION (HFPEF): Primary | ICD-10-CM

## 2025-06-27 RX ORDER — SPIRONOLACTONE 25 MG/1
25 TABLET ORAL DAILY
Qty: 90 TABLET | Refills: 1 | Status: SHIPPED | OUTPATIENT
Start: 2025-06-27 | End: 2025-07-02 | Stop reason: SDUPTHER

## 2025-07-02 DIAGNOSIS — I50.30 HEART FAILURE WITH PRESERVED LEFT VENTRICULAR FUNCTION (HFPEF): ICD-10-CM

## 2025-07-02 RX ORDER — SPIRONOLACTONE 25 MG/1
25 TABLET ORAL DAILY
Qty: 90 TABLET | Refills: 3 | Status: SHIPPED | OUTPATIENT
Start: 2025-07-02 | End: 2026-06-27

## 2025-07-09 DIAGNOSIS — Z95.2 MITRAL VALVE REPLACED: ICD-10-CM

## 2025-07-18 ENCOUNTER — HOSPITAL ENCOUNTER (OUTPATIENT)
Dept: CARDIOLOGY | Facility: CLINIC | Age: 70
Discharge: HOME | End: 2025-07-18
Payer: MEDICARE

## 2025-07-18 DIAGNOSIS — I44.2 ATRIOVENTRICULAR BLOCK, COMPLETE (MULTI): ICD-10-CM

## 2025-07-18 DIAGNOSIS — Z95.810 PRESENCE OF AUTOMATIC (IMPLANTABLE) CARDIAC DEFIBRILLATOR: ICD-10-CM

## 2025-07-18 DIAGNOSIS — Z95.810 PRESENCE OF AUTOMATIC (IMPLANTABLE) CARDIAC DEFIBRILLATOR: Primary | ICD-10-CM

## 2025-07-18 DIAGNOSIS — I44.30 UNSPECIFIED ATRIOVENTRICULAR BLOCK: ICD-10-CM

## 2025-07-18 PROCEDURE — 93296 REM INTERROG EVL PM/IDS: CPT

## 2025-07-25 ENCOUNTER — TELEPHONE (OUTPATIENT)
Dept: CARDIOLOGY | Facility: CLINIC | Age: 70
End: 2025-07-25

## 2025-07-30 ENCOUNTER — APPOINTMENT (OUTPATIENT)
Dept: PRIMARY CARE | Facility: CLINIC | Age: 70
End: 2025-07-30
Payer: MEDICARE

## 2025-08-05 ENCOUNTER — CLINICAL SUPPORT (OUTPATIENT)
Dept: CARDIAC REHAB | Facility: HOSPITAL | Age: 70
End: 2025-08-05

## 2025-08-05 DIAGNOSIS — Z95.2 MITRAL VALVE REPLACED: ICD-10-CM

## 2025-08-05 PROCEDURE — 9430000001 HC PHASE III CARDIAC REHAB MONTHLY FEE

## 2025-08-13 PROCEDURE — RXMED WILLOW AMBULATORY MEDICATION CHARGE

## 2025-08-15 ENCOUNTER — PHARMACY VISIT (OUTPATIENT)
Dept: PHARMACY | Facility: CLINIC | Age: 70
End: 2025-08-15
Payer: COMMERCIAL

## 2025-08-29 ENCOUNTER — APPOINTMENT (OUTPATIENT)
Dept: CARDIOLOGY | Facility: CLINIC | Age: 70
End: 2025-08-29
Payer: MEDICARE

## 2025-09-02 ENCOUNTER — CLINICAL SUPPORT (OUTPATIENT)
Dept: CARDIAC REHAB | Facility: HOSPITAL | Age: 70
End: 2025-09-02

## 2025-09-02 DIAGNOSIS — Z95.2 MITRAL VALVE REPLACED: ICD-10-CM

## 2025-09-02 PROCEDURE — 9430000001 HC PHASE III CARDIAC REHAB MONTHLY FEE

## 2025-09-09 ENCOUNTER — APPOINTMENT (OUTPATIENT)
Dept: PRIMARY CARE | Facility: CLINIC | Age: 70
End: 2025-09-09
Payer: MEDICARE

## 2025-09-19 ENCOUNTER — APPOINTMENT (OUTPATIENT)
Dept: PHARMACY | Facility: HOSPITAL | Age: 70
End: 2025-09-19
Payer: MEDICARE

## (undated) DEVICE — CONNECTOR, Y, CARDIOPULMONARY, 0.375 X 0.25 X 0.25 IN

## (undated) DEVICE — Device

## (undated) DEVICE — TUBING, SMOKE EVAC, 3/8 X 10 FT

## (undated) DEVICE — INTRODUCER, SHEATH, FAST-CATH, 7FR X 12CM, C-LOCK

## (undated) DEVICE — DRESSING, MEPILEX, BORDER, HEEL, 8.7 X 9.1 IN

## (undated) DEVICE — CATHETER, DRAINAGE, NASOGASTRIC, DOUBLE LUMEN, FUNNEL END, SUMP, SALEM, 18 FR, 48 IN, PVC, STERILE

## (undated) DEVICE — MANIFOLD, 4 PORT NEPTUNE STANDARD

## (undated) DEVICE — CLIPPER, SURGICAL BLADE ASSEMBLY, GENERAL PURPOSE, SINGLE USE

## (undated) DEVICE — CATHETER, CPS DIRECT UNIV, 47CM, STRAIGHT

## (undated) DEVICE — DEVICE, LOCKING LLD, EZ

## (undated) DEVICE — TRAY, SURESTEP, URINE METER, 14FR, SILICONE

## (undated) DEVICE — CABLE, BLUE, BIPOLAR & QUAD, 150CM, CATH EXT

## (undated) DEVICE — DRAPE, FLUID WARMER

## (undated) DEVICE — WASH SET, XTRA, 225ML

## (undated) DEVICE — SYRINGE, 60 CC, IRRIGATION, BULB, CONTRO-BULB, PAPER POUCH

## (undated) DEVICE — ATS SUCTION LINE

## (undated) DEVICE — ELECTRODE, QUICK-COMBO, EDGE SYSTEM, REDI PACK

## (undated) DEVICE — KIT, TOURNIQUET, 7"

## (undated) DEVICE — DRESSING, MEPILEX, BORDER, SACRUM, 8.7 X 9.8 IN

## (undated) DEVICE — SUTURE, SURGICAL STEEL, STERNUM 7, 18 IN, KV40, SINGL-WIRE

## (undated) DEVICE — KIT, COLLECTION, CARDIO

## (undated) DEVICE — INTRODUCER SYSTEM, PRELUDE SNAP, SPLITTABLE, 7 FR X 25 CM, ORANGE

## (undated) DEVICE — RETRACTOR, SUTURE, HOLDING, INSERT, OCTOBASE, DISPOSABLE

## (undated) DEVICE — CABLE, PACING PATIENT BIPOLAR 8'

## (undated) DEVICE — SHUNT, SENSOR

## (undated) DEVICE — COLLECTION UNIT, DRAINAGE, THORACIC, SINGLE TUBE, DRY SUCTION, ATS COMPATIBLE, OASIS 3600, LF

## (undated) DEVICE — GUIDEWIRE, SION, 0.014 X 190CM, STRT

## (undated) DEVICE — SPONGE, GAUZE, XRAY DECT, 16 PLY, 4 X 4, W/MASTER DMT,STERILE

## (undated) DEVICE — OXYGENATOR FX 15, W/HR, ARTERIAL FILTER

## (undated) DEVICE — SUTURE, PROLENE 4-0, TAPER POINT, SH-1 BLUE 30 INCH

## (undated) DEVICE — SPONGE, HEMOSTATIC, CELLULOSE, SURGICEL, 2 X 14 IN

## (undated) DEVICE — APPLICATOR, CHLORAPREP, W/ORANGE TINT, 26ML

## (undated) DEVICE — GUIDEWIRE, SION BLUE PTCA, 0.041 X 190CM, STRT

## (undated) DEVICE — ENVELOPE, ANTIBACTERIAL, AIGIS RX TYRX, ABSORBABLE, LRG

## (undated) DEVICE — CONNECTOR, STRAIGHT, 0.5 X 0.5 IN

## (undated) DEVICE — LEAD, PACING, MYOCARDIAL, BIPOLAR, TEMPORARY

## (undated) DEVICE — GOWN, SURGICAL, SMARTGOWN, XLARGE, STERILE

## (undated) DEVICE — FILTER, IV, BLOOD, MICROAGGREGATE, 40 MIC, RBC TRANSFUSION

## (undated) DEVICE — ADAPTER, CORONARY, PERFUSION, Y, 34.3 CM

## (undated) DEVICE — TIP, SUCTION, YANKAUER, FLEXIBLE

## (undated) DEVICE — TUBING, SUCTION, CONNECTING, NON-CONDUCTIVE, SURE GRIP CONNECTORS, 3/16 X 18 IN, PVC

## (undated) DEVICE — KIT, CELL SAVER, W/COLLECTION SET, 225ML WASH SET

## (undated) DEVICE — PACING CABLE, EXTENSION, 12 FT BEIGE, DISPOSABLE

## (undated) DEVICE — PREP KIT, BRIDGE, OCCLUSION

## (undated) DEVICE — OXYGENATOR FX 25, W/HR, ARTERIAL FILTER

## (undated) DEVICE — CLOSURE SYSTEM, VASCULAR, MVP 6-12FR, VENOUS

## (undated) DEVICE — PAD, ELECTRODE DEFIB PADPRO ADULT STRL W/ADAPTER

## (undated) DEVICE — WRENCH, HEX

## (undated) DEVICE — CATHETER, ELECTROPHYSIOLOGY, QUADRIPOLAR,  6FR X 120CM, JSN CURVE (REPROCESSED)

## (undated) DEVICE — MARKER, SKIN, DUAL TIP INK W/9 LABEL AND REMOVABLE TIME OUT SLEEVE

## (undated) DEVICE — DRAPE, SHEET, CARDIOVASCULAR, ANTIMICROBIAL, W/ANESTHESIA SCREEN, IOBAN 2, STERI DRAPE, 107 X 133 IN, DISPOSABLE, FABRIC, BLUE, STERILE

## (undated) DEVICE — KIT, STYLET, 52CM

## (undated) DEVICE — TUBING, SUCTION, CONNECTING, STERILE 0.25 X 120 IN., LF

## (undated) DEVICE — SPONGE, LAP, XRAY DECT, 18IN X 18IN, W/MASTER DMT, STERILE

## (undated) DEVICE — COVER, CART, 45 X 27 X 48 IN, CLEAR

## (undated) DEVICE — DRESSING, ADHESIVE, ISLAND, TELFA, 2 X 3.75 IN, LF

## (undated) DEVICE — GUIDEWIRE, INQWIRE, 3MM J, .035, 150

## (undated) DEVICE — CABLE, SURGICAL, SM CLIP

## (undated) DEVICE — WASH SET, XTRA, 125ML

## (undated) DEVICE — MAYO TRAY, SMALL

## (undated) DEVICE — KIT, FAST START

## (undated) DEVICE — DRESSING, ISLAND, TELFA, 4 X 5 IN

## (undated) DEVICE — PERFUSION SERVICES